# Patient Record
Sex: MALE | Race: WHITE
[De-identification: names, ages, dates, MRNs, and addresses within clinical notes are randomized per-mention and may not be internally consistent; named-entity substitution may affect disease eponyms.]

---

## 2019-11-26 ENCOUNTER — HOSPITAL ENCOUNTER (INPATIENT)
Dept: HOSPITAL 36 - GERO | Age: 69
LOS: 8 days | Discharge: ANOTHER HOSPITAL | DRG: 885 | End: 2019-12-04
Attending: PSYCHIATRY & NEUROLOGY | Admitting: PSYCHIATRY & NEUROLOGY
Payer: MEDICARE

## 2019-11-26 ENCOUNTER — HOSPITAL ENCOUNTER (EMERGENCY)
Dept: HOSPITAL 4 - SED | Age: 69
Discharge: PSYCHIATRIC OR SUBSTANCE USE FACILITY | End: 2019-11-26
Payer: COMMERCIAL

## 2019-11-26 VITALS — WEIGHT: 195 LBS | HEIGHT: 72 IN | BODY MASS INDEX: 26.41 KG/M2

## 2019-11-26 VITALS — DIASTOLIC BLOOD PRESSURE: 86 MMHG | SYSTOLIC BLOOD PRESSURE: 142 MMHG

## 2019-11-26 VITALS — SYSTOLIC BLOOD PRESSURE: 175 MMHG

## 2019-11-26 DIAGNOSIS — E86.0: ICD-10-CM

## 2019-11-26 DIAGNOSIS — F41.9: ICD-10-CM

## 2019-11-26 DIAGNOSIS — Z02.89: ICD-10-CM

## 2019-11-26 DIAGNOSIS — E11.9: ICD-10-CM

## 2019-11-26 DIAGNOSIS — I10: ICD-10-CM

## 2019-11-26 DIAGNOSIS — F03.90: ICD-10-CM

## 2019-11-26 DIAGNOSIS — E78.00: ICD-10-CM

## 2019-11-26 DIAGNOSIS — F23: ICD-10-CM

## 2019-11-26 DIAGNOSIS — G45.9: ICD-10-CM

## 2019-11-26 DIAGNOSIS — F29: ICD-10-CM

## 2019-11-26 DIAGNOSIS — F39: Primary | ICD-10-CM

## 2019-11-26 DIAGNOSIS — F25.9: ICD-10-CM

## 2019-11-26 DIAGNOSIS — E11.65: Primary | ICD-10-CM

## 2019-11-26 DIAGNOSIS — E11.22: ICD-10-CM

## 2019-11-26 DIAGNOSIS — R26.89: ICD-10-CM

## 2019-11-26 DIAGNOSIS — Z86.73: ICD-10-CM

## 2019-11-26 DIAGNOSIS — I12.9: ICD-10-CM

## 2019-11-26 DIAGNOSIS — N18.9: ICD-10-CM

## 2019-11-26 LAB
ALBUMIN SERPL BCP-MCNC: 3.5 G/DL (ref 3.4–4.8)
ALT SERPL W P-5'-P-CCNC: 31 U/L (ref 12–78)
AMPHETAMINES UR QL SCN: NEGATIVE
ANION GAP SERPL CALCULATED.3IONS-SCNC: 5 MMOL/L (ref 5–15)
APAP SERPL-MCNC: < 1 UG/ML (ref 1–30)
APPEARANCE UR: CLEAR
AST SERPL W P-5'-P-CCNC: 16 U/L (ref 10–37)
BACTERIA URNS QL MICRO: (no result) /HPF
BARBITURATES UR QL SCN: NEGATIVE
BASOPHILS # BLD AUTO: 0.1 K/UL (ref 0–0.2)
BASOPHILS NFR BLD AUTO: 0.7 % (ref 0–2)
BENZODIAZ UR QL SCN: NEGATIVE
BILIRUB SERPL-MCNC: 1.1 MG/DL (ref 0–1)
BILIRUB UR QL STRIP: NEGATIVE
BUN SERPL-MCNC: 23 MG/DL (ref 8–21)
BZE UR QL SCN: NEGATIVE
CALCIUM SERPL-MCNC: 9.3 MG/DL (ref 8.4–11)
CANNABINOIDS UR QL SCN: NEGATIVE
CHLORIDE SERPL-SCNC: 101 MMOL/L (ref 98–107)
CHOLEST SERPL-MCNC: 158 MG/DL (ref ?–200)
COLOR UR: YELLOW
CREAT SERPL-MCNC: 1.07 MG/DL (ref 0.55–1.3)
EOSINOPHIL # BLD AUTO: 0.1 K/UL (ref 0–0.4)
EOSINOPHIL NFR BLD AUTO: 1 % (ref 0–4)
ERYTHROCYTE [DISTWIDTH] IN BLOOD BY AUTOMATED COUNT: 13.7 % (ref 9–15)
ETHANOL SERPL-MCNC: < 3 MG/DL (ref ?–10)
GFR SERPL CREATININE-BSD FRML MDRD: 88 ML/MIN (ref 90–?)
GLUCOSE SERPL-MCNC: 317 MG/DL (ref 70–99)
GLUCOSE UR STRIP-MCNC: (no result) MG/DL
HCT VFR BLD AUTO: 42.9 % (ref 36–54)
HDLC SERPL-MCNC: 45 MG/DL (ref 45–?)
HGB BLD-MCNC: 14.6 G/DL (ref 14–18)
HGB UR QL STRIP: (no result)
KETONES UR STRIP-MCNC: NEGATIVE MG/DL
LDL CHOLESTEROL: 96 MG/DL (ref ?–100)
LEUKOCYTE ESTERASE UR QL STRIP: NEGATIVE
LYMPHOCYTES # BLD AUTO: 1.5 K/UL (ref 1–5.5)
LYMPHOCYTES NFR BLD AUTO: 17.6 % (ref 20.5–51.5)
MCH RBC QN AUTO: 32 PG (ref 27–31)
MCHC RBC AUTO-ENTMCNC: 34 % (ref 32–36)
MCV RBC AUTO: 93 FL (ref 79–98)
METHADONE UR-SCNC: NEGATIVE UMOL/L
METHAMPHET UR-SCNC: NEGATIVE UMOL/L
MONOCYTES # BLD MANUAL: 0.7 K/UL (ref 0–1)
MONOCYTES # BLD MANUAL: 8.5 % (ref 1.7–9.3)
NEUTROPHILS # BLD AUTO: 6.1 K/UL (ref 1.8–7.7)
NEUTROPHILS NFR BLD AUTO: 72.2 % (ref 40–70)
NITRITE UR QL STRIP: NEGATIVE
OPIATES UR QL SCN: NEGATIVE
OXYCODONE SERPL-MCNC: NEGATIVE NG/ML
PCP UR QL SCN: NEGATIVE
PH UR STRIP: 6 [PH] (ref 5–8)
PLATELET # BLD AUTO: 216 K/UL (ref 130–430)
POTASSIUM SERPL-SCNC: 4.7 MMOL/L (ref 3.5–5.1)
PROT UR QL STRIP: NEGATIVE
RBC # BLD AUTO: 4.63 MIL/UL (ref 4.2–6.2)
RBC #/AREA URNS HPF: (no result) /HPF (ref 0–3)
SODIUM SERPLBLD-SCNC: 136 MMOL/L (ref 136–145)
SP GR UR STRIP: <1.005 (ref 1–1.03)
TRICYCLICS UR-MCNC: NEGATIVE NG/ML
TRIGL SERPL-MCNC: 94 MG/DL (ref 30–150)
URINE PROPOXYPHENE SCREEN: NEGATIVE
UROBILINOGEN UR STRIP-MCNC: 0.2 MG/DL (ref 0.2–1)
WBC # BLD AUTO: 8.5 K/UL (ref 4.8–10.8)
WBC #/AREA URNS HPF: (no result) /HPF (ref 0–3)

## 2019-11-26 PROCEDURE — 83036 HEMOGLOBIN GLYCOSYLATED A1C: CPT

## 2019-11-26 PROCEDURE — 82962 GLUCOSE BLOOD TEST: CPT

## 2019-11-26 PROCEDURE — 99285 EMERGENCY DEPT VISIT HI MDM: CPT

## 2019-11-26 PROCEDURE — 80061 LIPID PANEL: CPT

## 2019-11-26 PROCEDURE — 80307 DRUG TEST PRSMV CHEM ANLYZR: CPT

## 2019-11-26 PROCEDURE — G0480 DRUG TEST DEF 1-7 CLASSES: HCPCS

## 2019-11-26 PROCEDURE — 85025 COMPLETE CBC W/AUTO DIFF WBC: CPT

## 2019-11-26 PROCEDURE — 36415 COLL VENOUS BLD VENIPUNCTURE: CPT

## 2019-11-26 PROCEDURE — 80053 COMPREHEN METABOLIC PANEL: CPT

## 2019-11-26 PROCEDURE — 96372 THER/PROPH/DIAG INJ SC/IM: CPT

## 2019-11-26 PROCEDURE — 81000 URINALYSIS NONAUTO W/SCOPE: CPT

## 2019-11-26 PROCEDURE — 87081 CULTURE SCREEN ONLY: CPT

## 2019-11-26 PROCEDURE — G0481 DRUG TEST DEF 8-14 CLASSES: HCPCS

## 2019-11-26 PROCEDURE — G0482 DRUG TEST DEF 15-21 CLASSES: HCPCS

## 2019-11-26 NOTE — NUR
Patient to be transferred to [facility].  Is being transferred due to higher 
level of care.  Receiving facility has accepting physician and available space. 
ER physician has signed transfer form.  Patient or responsible party has agreed 
to transfer and signed form.  Patient belongings inventoried and will be sent 
with patient.  Copy of nursing notes, lab reports, EKG, Physicians Orders and 
X-rays to be sent with patient.  Report called to Tory at receiving facility.  
ambulance service has been called for transfer.

## 2019-11-26 NOTE — NUR
Pt brought by self, A&Ox1, pt presents to ER for medical clearance prior 
transfering to [facility], pt arrived nayeli  wheelchair , respirations even 
and unlabored, cap refill <3,

## 2019-11-26 NOTE — NUR
-------------------------------------------------------------------------------

           *** Note undone in EDM - 11/26/19 at 1748 by [email or web address] ***            

-------------------------------------------------------------------------------

PREM Bosch at bedside examining patient.

## 2019-11-27 ENCOUNTER — HOSPITAL ENCOUNTER (OUTPATIENT)
Dept: HOSPITAL 4 - SLB | Age: 69
Discharge: HOME | End: 2019-11-27
Payer: COMMERCIAL

## 2019-11-27 DIAGNOSIS — E80.7: ICD-10-CM

## 2019-11-27 DIAGNOSIS — N28.9: ICD-10-CM

## 2019-11-27 DIAGNOSIS — Z79.4: ICD-10-CM

## 2019-11-27 DIAGNOSIS — E11.9: Primary | ICD-10-CM

## 2019-11-27 DIAGNOSIS — I10: ICD-10-CM

## 2019-11-27 DIAGNOSIS — Z86.73: ICD-10-CM

## 2019-11-27 DIAGNOSIS — R26.9: ICD-10-CM

## 2019-11-27 DIAGNOSIS — E78.00: ICD-10-CM

## 2019-11-27 DIAGNOSIS — F03.90: ICD-10-CM

## 2019-11-27 DIAGNOSIS — Z87.891: ICD-10-CM

## 2019-11-27 DIAGNOSIS — F20.9: ICD-10-CM

## 2019-11-27 LAB
CHOLEST SERPL-MCNC: 176 MG/DL (ref ?–200)
HDLC SERPL-MCNC: 51 MG/DL (ref 45–?)
LDL CHOLESTEROL: 116 MG/DL (ref ?–100)
TRIGL SERPL-MCNC: 84 MG/DL (ref 30–150)

## 2019-11-27 RX ADMIN — INSULIN GLARGINE SCH: 100 INJECTION, SOLUTION SUBCUTANEOUS at 21:43

## 2019-11-27 RX ADMIN — INSULIN GLARGINE SCH: 100 INJECTION, SOLUTION SUBCUTANEOUS at 10:46

## 2019-11-27 RX ADMIN — INSULIN LISPRO SCH: 100 INJECTION, SOLUTION INTRAVENOUS; SUBCUTANEOUS at 21:44

## 2019-11-27 NOTE — HISTORY & PHYSICAL
ADMIT DATE:  11/26/2019



CHIEF COMPLAINT:  Admitted for inpatient psych unit.



HISTORY OF PRESENT ILLNESS:  This is a 69-year-old  male with history

of diabetes, renal insufficiency, hypertension, high cholesterol, history of

stroke, dementia, admitted from [facility] to inpatient

psych.  The patient was cleared medically from the ER.  The patient is a poor

historian.  Denies chest pain, shortness of breath.



PAST MEDICAL HISTORY:  As mentioned in history of present illness.



PAST SURGICAL HISTORY:  Denies surgeries in the past.



ALLERGIES:  No known drug allergies.



MEDICATIONS:  The patient is on amlodipine, aspirin, atorvastatin, Dulcolax,

Colace, fleet enema, Lantus insulin sliding scale, ____, Namenda, multivitamin,

Seroquel.



FAMILY HISTORY:  Noncontributory.



SOCIAL HISTORY:  The patient is a tailor.  He used to smoke, drink.  No drug

use.  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



REVIEW OF SYSTEMS:

GENERAL:  Complaints not feeling well.

HEENT:  No blurred vision or pain.

LUNGS:  No COPD or asthma.

HEART:  The patient with hypertension, history of stroke.

ABDOMEN:  No nausea, vomiting or pain.

GENITOURINARY:  The patient denies increased frequency or dysuria.

NEUROLOGIC:  No headache, seizure, history of stroke.

PSYCHIATRIC:  See above.

ENDOCRINE:  The patient has diabetes.



PHYSICAL EXAMINATION:

VITAL SIGNS:  Blood pressure 126/78, respirations 20, pulse 79, temperature

98.0.

GENERAL:  Elderly male, disheveled.

NECK:  Supple.

LUNGS:  Clear breath sounds, few rhonchi.

HEART:  Regular rate and rhythm with systolic ejection murmur.

ABDOMEN:  Soft, globular.

EXTREMITIES:  Positive excoriation, no deformity.

NEUROLOGIC:  Limited.



LABORATORY DATA:  WBC 8, hemoglobin 14, platelets 216.  UA 3+ ketones and few

bacteria.  Glucose 317, BUN 23, creatinine 1.07.  Total bilirubin is 1.1.  AST

and ALT 36 and 31.



ASSESSMENT:

1.  Diabetes.

2.  Renal insufficiency, elevated bilirubin.

3.  Hypertension.

4.  History of stroke.

5.  Dementia.

6.  Schizoaffective disorder.

7.  Hypercholesterolemia.

8.  Gait instability.



PLAN:  ____ insulin sliding scale.  We will start the patient on blood pressure

medication.  Continue fall precaution.  Continue aspirin.  We will continue

monitoring closely with you, Dr. Arguello.





DD: 11/27/2019 13:01

DT: 11/27/2019 13:27

JOB# [id number]  [id number]

## 2019-11-27 NOTE — PSYCHIATRIC EVALUATION
DATE OF SERVICE:  11/27/2019



HISTORY OF PRESENT ILLNESS:  A 69-year-old male transferred from skilled

nursing.  Apparently was aggressive, agitated, trying to strike out, yelling

constantly, refusing medications, refusing pretty much everything.  The patient

is AO to name, not place, not situation, not year, not month.  He gets very mad

as I asked him questions, started yelling at me, screaming, having grunting at

me.



PAST PSYCHIATRIC HISTORY:  Unclear likely dementia.



FAMILY HISTORY:  Unclear, not answering.



SOCIAL HISTORY:  Does not tell me where he was born and states he was born in

1911.  This is incorrect.  He was born in 1950.  Unclear family support.



MEDICATIONS:  Reviewed.



MEDICAL HISTORY:  Noted.



MENTAL STATUS EXAMINATION:  Stated age, disheveled, unkempt, angry, upset,

yelling, screaming, confused.  Unclear psychotic symptoms.  No overt suicidal

ideations, poor impulse control.



PROVISIONAL DIAGNOSES:  Mood, unspecified; anxiety, unspecified; psychosis,

unspecified; rule out dementia, likely dementia.



MEDICAL:  Please see full H and P.



ESTIMATED LENGTH OF STAY:  7-10 days.



ASSESSMENT:  The patient requiring hospitalization.  Angry upset, agitated,

striking out.



TREATMENT PLAN:  Includes group as well as milieu therapy.



CONDITIONS FOR DISCHARGE:  Improved mood, improved affect.





DD: 11/27/2019 12:32

DT: 11/27/2019 17:48

JOB# [id number]  [id number]

## 2019-11-28 RX ADMIN — INSULIN GLARGINE SCH: 100 INJECTION, SOLUTION SUBCUTANEOUS at 21:43

## 2019-11-28 RX ADMIN — INSULIN GLARGINE SCH: 100 INJECTION, SOLUTION SUBCUTANEOUS at 08:09

## 2019-11-28 RX ADMIN — INSULIN LISPRO SCH: 100 INJECTION, SOLUTION INTRAVENOUS; SUBCUTANEOUS at 21:45

## 2019-11-28 NOTE — PROGRESS NOTES
DATE:  11/28/2019



SUBJECTIVE:  A 69-year-old male transferred from skilled nursing.  The patient

is aggressive, agitated, trying to strike, yelling, very irritable, yelling this

morning, agitated this morning, not wanting to talk to me.  Per ,

the patient is coming from [facility].  The patient remains

symptomatic, impulsive, irritable, refusing vitals for example, on and off

feeling episodes, unkempt.  We will continue to monitor ongoing symptoms. 

Continue dosing of Seroquel.  We will consider the addition of a mood

stabilizing medication.





DD: 11/28/2019 06:27

DT: 11/28/2019 06:35

JOB# [id number]  [id number]

## 2019-11-28 NOTE — INTERNAL MEDICINE PROG NOTE
Internal Medicine Subjective





- Subjective


Patient seen and examined:: with staff, chart reviewed


Patient is:: awake, verbal, interactive, in bed


Per staff patient has:: no adverse event, no episodes of fall, poor appetite, 

tolerating meds





Internal Medicine Objective





- Physical Exam


Vitals and I&O: 


 Vital Signs











Temp  96.3 F   11/27/19 15:43


 


Pulse  67   11/27/19 15:43


 


Resp  20   11/27/19 20:00


 


BP  143/94   11/27/19 15:43


 


Pulse Ox  98   11/27/19 15:43








 Intake & Output











 11/27/19 11/28/19 11/28/19





 18:59 06:59 18:59


 


Intake Total  240 


 


Balance  240 


 


Intake:   


 


  Oral  240 


 


Other:   


 


  # Voids  2 


 


  # Bowel Movements  0 











Active Medications: 


Current Medications





Acetaminophen (Tylenol)  650 mg PO Q4HR PRN


   PRN Reason: Mild Pain / Temp above 100


   Stop: 01/25/20 20:49


Al Hydrox/Mg Hydrox/Simethicone (Maalox)  30 ml PO Q4HR PRN


   PRN Reason: GI DISTRESS


   Stop: 01/25/20 22:43


Amlodipine Besylate (Norvasc)  5 mg PO BID [facility]


   Stop: 01/26/20 08:59


   Last Admin: 11/28/19 08:09 Dose:  Not Given


Aspirin (Aspirin)  325 mg PO DAILY@1200 [facility]


   Stop: 01/26/20 11:59


   Last Admin: 11/28/19 12:31 Dose:  Not Given


Atorvastatin Calcium (Lipitor)  40 mg PO HS [facility]


   Stop: 01/26/20 20:59


   Last Admin: 11/27/19 21:43 Dose:  Not Given


Bisacodyl (Dulcolax 10 Mg Supp)  10 mg RC PRN PRN


   PRN Reason: if MOM is ineffective


   Stop: 01/25/20 22:49


Divalproex Sodium (Depakote Dr)  250 mg PO BID [facility]; Protocol


   Stop: 01/27/20 08:59


   Last Admin: 11/28/19 08:09 Dose:  Not Given


Docusate Sodium (Colace)  100 mg PO DAILY [facility]


   Stop: 01/26/20 08:59


   Last Admin: 11/28/19 08:09 Dose:  Not Given


Insulin Glargine (Lantus Insulin)  10 units SUBQ Q12HR [facility]


   Stop: 01/26/20 08:59


   Last Admin: 11/28/19 08:09 Dose:  Not Given


Insulin Human Lispro (Humalog Insulin Sliding Scale)  100 units SUBQ HS [facility]; 

Protocol


   Stop: 01/26/20 20:59


   Last Admin: 11/27/19 21:44 Dose:  Not Given


Lisinopril (Zestril)  10 mg PO HS [facility]


   Stop: 01/26/20 20:59


   Last Admin: 11/27/19 21:44 Dose:  Not Given


Lorazepam (Ativan)  0.5 mg PO Q4HR PRN; Protocol


   PRN Reason: Anxiety


   Stop: 12/26/19 22:43


   Last Admin: 11/28/19 01:44 Dose:  0.5 mg


Magnesium Hydroxide (Milk Of Magnesia)  30 ml PO HS PRN


   PRN Reason: Constipation


Memantine (Namenda)  10 mg PO BID [facility]


   Stop: 01/26/20 08:59


   Last Admin: 11/28/19 08:09 Dose:  Not Given


Multivitamins/Vitamin C (Theragran)  1 tab PO DAILY [facility]


   Stop: 01/26/20 08:59


   Last Admin: 11/28/19 08:09 Dose:  Not Given


Quetiapine Fumarate (Seroquel)  400 mg PO BID [facility]; Protocol


   Stop: 01/26/20 08:59


   Last Admin: 11/28/19 08:09 Dose:  Not Given


Zolpidem Tartrate (Ambien)  5 mg PO HS PRN


   PRN Reason: Insomnia


   Stop: 01/25/20 22:43








General: weak


HEENT: NC/AT, PERRLA


Neck: Supple, No JVD


Lungs: CTAB


Cardiovascular: RRR, Normal S1, Normal S2, with murmur


Abdomen: soft, globular, non-distended, positive bowel sound


Extremities: excoriation


Neurological: no change





Internal Medicine Assmt/Plan





- Assessment


Assessment: 





ASSESSMENT:


1.  Diabetes.


2.  Renal insufficiency, elevated bilirubin.


3.  Hypertension.


4.  History of stroke.


5.  Dementia.


6.  Schizoaffective disorder.


7.  Hypercholesterolemia.


8.  Gait instability.











- Plan


Plan: 











PLAN:  __cont on __ insulin sliding scale.  We will start the patient on blood 

pressure


medication.  Continue fall precaution.  Continue aspirin.  We will continue


monitoring closely 


miguelangel lopez

## 2019-11-29 RX ADMIN — INSULIN GLARGINE SCH: 100 INJECTION, SOLUTION SUBCUTANEOUS at 21:13

## 2019-11-29 RX ADMIN — INSULIN LISPRO SCH: 100 INJECTION, SOLUTION INTRAVENOUS; SUBCUTANEOUS at 21:14

## 2019-11-29 RX ADMIN — INSULIN GLARGINE SCH: 100 INJECTION, SOLUTION SUBCUTANEOUS at 08:22

## 2019-11-29 NOTE — PROGRESS NOTES
DATE:  11/29/2019



SUBJECTIVE:  A 69-year-old male coming into the hospital, really upset,

irritable, refusing to speak with me.  Staff noting he is very morales and rude,

poorly oriented, confused, some agitation, verbally abusive towards staff. 

Medications were noted.  Vitals were reviewed.



PLAN:  We will continue to monitor.  Continue dosing of Seroquel.  I did add

Depakote to his regimen.





DD: 11/29/2019 06:16

DT: 11/29/2019 06:31

JOB# [id number]  [id number]

## 2019-11-30 RX ADMIN — INSULIN LISPRO SCH UNITS: 100 INJECTION, SOLUTION INTRAVENOUS; SUBCUTANEOUS at 20:38

## 2019-11-30 RX ADMIN — INSULIN GLARGINE SCH: 100 INJECTION, SOLUTION SUBCUTANEOUS at 08:06

## 2019-11-30 RX ADMIN — INSULIN GLARGINE SCH UNITS: 100 INJECTION, SOLUTION SUBCUTANEOUS at 20:36

## 2019-11-30 NOTE — PROGRESS NOTES
DATE:  11/30/2019



Covering for Dr. Anthony.



Case was discussed with staff of the patient, reviewed records.  The patient was

admitted on 11/27/2019 because of aggressive behavior.  He was striking out,

yelling constantly, refusing medication, refusing pretty much everything and

oriented to name only, not place or situation.  He is demented, confused, unable

to make safe plan for self-care.  Continues to have poor insight, easily

agitated.  No side effects with the medication, no sedation, no nausea.  Tend to

be very upset.  In general, he has been on Depakote 250 mg twice a day and

Namenda 10 mg twice a day, Seroquel 400 mg twice a day.  No side effects, no

sedation, no nausea, no extrapyramidal symptoms.  We will continue outpatient

group therapy, milieu therapy, and adjust medications as needed.





DD: 11/30/2019 12:21

DT: 11/30/2019 14:56

JOB# [id number]  [id number]

## 2019-11-30 NOTE — INTERNAL MEDICINE PROG NOTE
Internal Medicine Subjective





- Subjective


Patient seen and examined:: with staff, chart reviewed


Patient is:: awake, verbal, interactive, in bed


Per staff patient has:: no adverse event, no episodes of fall, poor appetite, 

tolerating meds





Internal Medicine Objective





- Physical Exam


Vitals and I&O: 


 Vital Signs











Temp  98.0 F   19 14:00


 


Pulse  74   19 14:00


 


Resp  20   19 14:00


 


BP  130/80   19 14:00


 


Pulse Ox  94   19 14:00








 Intake & Output











 19





 18:59 06:59 18:59


 


Intake Total  480 


 


Output Total  2 


 


Balance  478 


 


Intake:   


 


  Oral  480 


 


Output:   


 


  Urine/Stool Mix  2 


 


Other:   


 


  # Voids  1 











Active Medications: 


Current Medications





Acetaminophen (Tylenol)  650 mg PO Q4HR PRN


   PRN Reason: Mild Pain / Temp above 100


   Stop: 20 20:49


Al Hydrox/Mg Hydrox/Simethicone (Maalox)  30 ml PO Q4HR PRN


   PRN Reason: GI DISTRESS


   Stop: 20 22:43


Amlodipine Besylate (Norvasc)  5 mg PO BID [facility]


   Stop: 20 08:59


   Last Admin: 19 08:06 Dose:  Not Given


Aspirin (Aspirin)  325 mg PO DAILY@1200 [facility]


   Stop: 20 11:59


   Last Admin: 19 12:44 Dose:  Not Given


Atorvastatin Calcium (Lipitor)  40 mg PO HS [facility]


   Stop: 20 20:59


   Last Admin: 19 20:49 Dose:  40 mg


Bisacodyl (Dulcolax 10 Mg Supp)  10 mg RC PRN PRN


   PRN Reason: if MOM is ineffective


   Stop: 20 22:49


Divalproex Sodium (Depakote Dr)  250 mg PO BID [facility]; Protocol


   Stop: 20 08:59


   Last Admin: 19 08:06 Dose:  Not Given


Docusate Sodium (Colace)  100 mg PO DAILY [facility]


   Stop: 20 08:59


   Last Admin: 19 08:06 Dose:  Not Given


Insulin Glargine (Lantus Insulin)  10 units SUBQ Q12HR [facility]


   Stop: 20 08:59


   Last Admin: 19 08:06 Dose:  Not Given


Insulin Human Lispro (Humalog Insulin Sliding Scale)  100 units SUBQ HS [facility]; 

Protocol


   Stop: 20 20:59


   Last Admin: 19 21:14 Dose:  Not Given


Lisinopril (Zestril)  10 mg PO HS [facility]


   Stop: 20 20:59


   Last Admin: 19 20:49 Dose:  10 mg


Lorazepam (Ativan)  0.5 mg PO Q4HR PRN; Protocol


   PRN Reason: Anxiety


   Stop: 19 22:43


   Last Admin: 19 02:32 Dose:  0.5 mg


Magnesium Hydroxide (Milk Of Magnesia)  30 ml PO HS PRN


   PRN Reason: Constipation


Memantine (Namenda)  10 mg PO BID [facility]


   Stop: 20 08:59


   Last Admin: 19 08:06 Dose:  Not Given


Multivitamins/Vitamin C (Theragran)  1 tab PO DAILY [facility]


   Stop: 20 08:59


   Last Admin: 19 08:06 Dose:  Not Given


Quetiapine Fumarate (Seroquel)  400 mg PO BID [facility]; Protocol


   Stop: 20 08:59


   Last Admin: 19 08:06 Dose:  Not Given


Zolpidem Tartrate (Ambien)  5 mg PO HS PRN


   PRN Reason: Insomnia


   Stop: 20 22:43


   Last Admin: 19 20:49 Dose:  5 mg








General: weak


HEENT: NC/AT, PERRLA


Neck: Supple, No JVD


Lungs: CTAB


Cardiovascular: RRR, Normal S1, Normal S2, with murmur


Abdomen: soft, globular, non-distended, positive bowel sound


Extremities: excoriation


Neurological: no change





Internal Medicine Assmt/Plan





- Assessment


Assessment: 





ASSESSMENT:


1.  Diabetes.


2.  Renal insufficiency, elevated bilirubin.


3.  Hypertension.


4.  History of stroke.


5.  Dementia.


6.  Schizoaffective disorder.


7.  Hypercholesterolemia.


8.  Gait instability.











- Plan


Plan: 











PLAN:  __cont on __ insulin sliding scale.  We will start the patient on blood 

pressure


medication.  Continue fall precaution.  Continue aspirin.  We will continue


monitoring closely 


dw rn





Nutritional Asmnt/Malnutr-PDOC





- Dietary Evaluation


Malnutrition Findings (Please click <Entered> for more info): 








Nutritional Asmnt/Malnutrition                             Start:  19 09:

31


Text:                                                      Status: Complete    

  


Freq:                                                                          

  


Protocol:                                                                      

  


 Document     19 09:31  ABIOLA  (Rec: 19 09:49  MARLODONITA MADRID-

FNS1)


 Nutritional Asmnt/Malnutrition


     Patient General Information


      Nutritional Screening                      Moderate Risk


      Diagnosis                                  Psychosis


      Pertinent Medical Hx/Surgical Hx           Diabetes, Renal insufficiency,


                                                 hypertension, high


                                                 cholesterol, history of stroke


                                                 , dementia.


      Subjective Information                     Admitted from [facility].


                                                 Per nursing notes, BG was 344


                                                 but patient refused to take


                                                 his insulin. Patient


                                                 tolerating current diet


                                                 without noted difficulty.


      Current Diet Order/ Nutrition Support      60 gm CCHO, Chopped


      Patient / S.O                              Not Indicated


      Pertinent Medications                      Maalox, Lipitor, Dulcolax,


                                                 COlace, Lantus, humalog, MOM,


                                                 theragran


      Pertinent Labs                             POC glucose 344 per nursing


                                                 note


     Nutritional Hx/Data


      Height                                     1.73 m


      Height (Calculated Centimeters)            172.7


      Current Weight (lbs)                       74.843 kg


      Weight (Calculated Kilograms)              74.8


      Weight (Calculated Grams)                  10406.7


      Ideal Body Weight                          154


      % Ideal Body Weight                        107


      Body Mass Index (BMI)                      25.0


      Recent Weight Change                       No


      Weight Status                              Overweight


     GI Symptoms


      GI Symptoms                                None


      Last BM                                    11/29 x 1


      Difficult in:                              None


      Food Allergies                             No


      Cultural/Ethnic/Confucianism Belief           none indicated


      Usual diet at home                         unknown


      Skin Integrity/Comment:                    Sanchez 13, Intact


      Current %PO                                Good (%)


     Estimated Nutritional Goals


      BEE in Kcals:                              Using Current wt


      Calories/Kcals/Kg                          75kg CBW


      Kcals Calculated                           ~5903-3049 kcal/day


      Protein:                                   Using Current wt


      Protein g/k.8-1 gm/kg


      Protein Calculated                         ~60-75 gm/day


      Fluid: ml                                  ~1706-9802 ml/day (1 ml/kcal)


     Nutritional Problem


      1. Problem


       Problem                                   Altered nutrition related lab


                                                 values related to


       Etiology                                  uncontrolled hyperglycemia aeb


       Signs/Symptoms:                           Glucose 344


     Intervention/Recommendation


      Comments                                   1. Continue 60 gm CCHO,


                                                 Chopped diet as tolerated by


                                                 patient.


                                                 2. MD to adjust insulin


                                                 regimen as needed for optimal


                                                 glycemic control. Nursing to


                                                 continue to encourage patient


                                                 to take insulin as prescribed.


     Expected Outcomes/Goals


      Expected Outcomes/Goals                    Oral intake >75% of meals,


                                                 weight stable, nutrition


                                                 related labs WNL


                                                 F/U MR 12/3-

## 2019-12-01 RX ADMIN — INSULIN GLARGINE SCH UNITS: 100 INJECTION, SOLUTION SUBCUTANEOUS at 08:51

## 2019-12-01 RX ADMIN — INSULIN GLARGINE SCH UNITS: 100 INJECTION, SOLUTION SUBCUTANEOUS at 21:52

## 2019-12-01 RX ADMIN — INSULIN LISPRO SCH UNITS: 100 INJECTION, SOLUTION INTRAVENOUS; SUBCUTANEOUS at 21:52

## 2019-12-01 NOTE — INTERNAL MEDICINE PROG NOTE
Internal Medicine Subjective





- Subjective


Patient seen and examined:: with staff, chart reviewed


Patient is:: awake, verbal, interactive, in bed


Per staff patient has:: no adverse event, no episodes of fall, poor appetite, 

tolerating meds





Internal Medicine Objective





- Physical Exam


Vitals and I&O: 


 Vital Signs











Temp  97.3 F   19 14:04


 


Pulse  70   19 14:04


 


Resp  16   19 14:04


 


BP  131/78   19 14:04


 


Pulse Ox  99   19 14:04








 Intake & Output











 19





 18:59 06:59 18:59


 


Intake Total 1300 240 


 


Balance 1300 240 


 


Intake:   


 


  Oral 1300 240 


 


Other:   


 


  # Voids 4 2 


 


  # Bowel Movements 0  











Active Medications: 


Current Medications





Acetaminophen (Tylenol)  650 mg PO Q4HR PRN


   PRN Reason: Mild Pain / Temp above 100


   Stop: 20 20:49


Al Hydrox/Mg Hydrox/Simethicone (Maalox)  30 ml PO Q4HR PRN


   PRN Reason: GI DISTRESS


   Stop: 20 22:43


Amlodipine Besylate (Norvasc)  5 mg PO BID [facility]


   Stop: 20 08:59


   Last Admin: 19 09:36 Dose:  Not Given


Aspirin (Aspirin)  325 mg PO DAILY@1200 [facility]


   Stop: 20 11:59


   Last Admin: 19 12:44 Dose:  Not Given


Atorvastatin Calcium (Lipitor)  40 mg PO HS [facility]


   Stop: 20 20:59


   Last Admin: 19 20:36 Dose:  40 mg


Bisacodyl (Dulcolax 10 Mg Supp)  10 mg RC PRN PRN


   PRN Reason: if MOM is ineffective


   Stop: 20 22:49


Divalproex Sodium (Depakote Dr)  250 mg PO BID [facility]; Protocol


   Stop: 20 08:59


   Last Admin: 19 08:38 Dose:  250 mg


Docusate Sodium (Colace)  100 mg PO DAILY [facility]


   Stop: 20 08:59


   Last Admin: 19 08:38 Dose:  100 mg


Insulin Glargine (Lantus Insulin)  10 units SUBQ Q12HR [facility]


   Stop: 20 08:59


   Last Admin: 19 08:51 Dose:  10 units


Insulin Human Lispro (Humalog Insulin Sliding Scale)  100 units SUBQ HS [facility]; 

Protocol


   Stop: 20 20:59


   Last Admin: 19 20:38 Dose:  4 units


Lisinopril (Zestril)  10 mg PO HS [facility]


   Stop: 20 20:59


   Last Admin: 19 20:41 Dose:  10 mg


Lorazepam (Ativan)  0.5 mg PO Q4HR PRN; Protocol


   PRN Reason: Anxiety


   Stop: 19 22:43


   Last Admin: 19 02:32 Dose:  0.5 mg


Magnesium Hydroxide (Milk Of Magnesia)  30 ml PO HS PRN


   PRN Reason: Constipation


Memantine (Namenda)  10 mg PO BID [facility]


   Stop: 20 08:59


   Last Admin: 19 08:38 Dose:  10 mg


Multivitamins/Vitamin C (Theragran)  1 tab PO DAILY [facility]


   Stop: 20 08:59


   Last Admin: 19 08:38 Dose:  1 tab


Quetiapine Fumarate (Seroquel)  400 mg PO BID [facility]; Protocol


   Stop: 20 08:59


   Last Admin: 19 08:38 Dose:  400 mg


Zolpidem Tartrate (Ambien)  5 mg PO HS PRN


   PRN Reason: Insomnia


   Stop: 20 22:43


   Last Admin: 19 20:43 Dose:  5 mg








General: weak


HEENT: NC/AT, PERRLA


Neck: Supple, No JVD


Lungs: CTAB


Cardiovascular: RRR, Normal S1, Normal S2, with murmur


Abdomen: soft, globular, non-distended, positive bowel sound


Extremities: excoriation


Neurological: no change





Internal Medicine Assmt/Plan





- Assessment


Assessment: 





ASSESSMENT:


1.  Diabetes.


2.  Renal insufficiency, elevated bilirubin.


3.  Hypertension.


4.  History of stroke.


5.  Dementia.


6.  Schizoaffective disorder.


7.  Hypercholesterolemia.


8.  Gait instability.











- Plan


Plan: 











PLAN:  __cont on __ insulin sliding scale.  We will start the patient on blood 

pressure


medication.  Continue fall precaution.  Continue aspirin.  We will continue


monitoring closely 


dw rn





Nutritional Asmnt/Malnutr-PDOC





- Dietary Evaluation


Malnutrition Findings (Please click <Entered> for more info): 








Nutritional Asmnt/Malnutrition                             Start:  19 09:

31


Text:                                                      Status: Complete    

  


Freq:                                                                          

  


Protocol:                                                                      

  


 Document     19 09:31  ABIOLA  (Rec: 19 09:49  MARLODONITA MADRID-

FNS1)


 Nutritional Asmnt/Malnutrition


     Patient General Information


      Nutritional Screening                      Moderate Risk


      Diagnosis                                  Psychosis


      Pertinent Medical Hx/Surgical Hx           Diabetes, Renal insufficiency,


                                                 hypertension, high


                                                 cholesterol, history of stroke


                                                 , dementia.


      Subjective Information                     Admitted from [facility].


                                                 Per nursing notes, BG was 344


                                                 but patient refused to take


                                                 his insulin. Patient


                                                 tolerating current diet


                                                 without noted difficulty.


      Current Diet Order/ Nutrition Support      60 gm CCHO, Chopped


      Patient / S.O                              Not Indicated


      Pertinent Medications                      Maalox, Lipitor, Dulcolax,


                                                 COlace, Lantus, humalog, MOM,


                                                 theragran


      Pertinent Labs                             POC glucose 344 per nursing


                                                 note


     Nutritional Hx/Data


      Height                                     1.73 m


      Height (Calculated Centimeters)            172.7


      Current Weight (lbs)                       74.843 kg


      Weight (Calculated Kilograms)              74.8


      Weight (Calculated Grams)                  92400.7


      Ideal Body Weight                          154


      % Ideal Body Weight                        107


      Body Mass Index (BMI)                      25.0


      Recent Weight Change                       No


      Weight Status                              Overweight


     GI Symptoms


      GI Symptoms                                None


      Last BM                                    11/29 x 1


      Difficult in:                              None


      Food Allergies                             No


      Cultural/Ethnic/[employer]ist Belief           none indicated


      Usual diet at home                         unknown


      Skin Integrity/Comment:                    Sanchez 13, Intact


      Current %PO                                Good (%)


     Estimated Nutritional Goals


      BEE in Kcals:                              Using Current wt


      Calories/Kcals/Kg                          75kg CBW


      Kcals Calculated                           ~1376-1933 kcal/day


      Protein:                                   Using Current wt


      Protein g/k.8-1 gm/kg


      Protein Calculated                         ~60-75 gm/day


      Fluid: ml                                  ~1771-2965 ml/day (1 ml/kcal)


     Nutritional Problem


      1. Problem


       Problem                                   Altered nutrition related lab


                                                 values related to


       Etiology                                  uncontrolled hyperglycemia aeb


       Signs/Symptoms:                           Glucose 344


     Intervention/Recommendation


      Comments                                   1. Continue 60 gm CCHO,


                                                 Chopped diet as tolerated by


                                                 patient.


                                                 2. MD to adjust insulin


                                                 regimen as needed for optimal


                                                 glycemic control. Nursing to


                                                 continue to encourage patient


                                                 to take insulin as prescribed.


     Expected Outcomes/Goals


      Expected Outcomes/Goals                    Oral intake >75% of meals,


                                                 weight stable, nutrition


                                                 related labs WNL


                                                 F/U  12/3-

## 2019-12-01 NOTE — PROGRESS NOTES
DATE:  12/01/2019



Case was discussed with staff of the patient, reviewed the records.  The patient

continues to be unpredictable, impulsive with episodes of yelling and refusing

at times medication, hard to redirect, disoriented, confused, and continues to

have poor insight.  No side effects of the medication, no sedation, no nausea,

no extrapyramidal symptoms.  However, he is not taking medications, sometimes,

it is hard to adjust medication that he is not taking and we will continue to

work with the patient in group therapy, milieu therapy, and adjust medications

as needed.





DD: 12/01/2019 12:24

DT: 12/01/2019 15:39

JOB# [id number]  [id number]

## 2019-12-02 RX ADMIN — INSULIN LISPRO SCH UNITS: 100 INJECTION, SOLUTION INTRAVENOUS; SUBCUTANEOUS at 21:02

## 2019-12-02 RX ADMIN — INSULIN GLARGINE SCH UNITS: 100 INJECTION, SOLUTION SUBCUTANEOUS at 10:16

## 2019-12-02 RX ADMIN — INSULIN GLARGINE SCH UNITS: 100 INJECTION, SOLUTION SUBCUTANEOUS at 21:00

## 2019-12-02 NOTE — INTERNAL MEDICINE PROG NOTE
Internal Medicine Subjective





- Subjective


Patient seen and examined:: with staff, chart reviewed


Patient is:: awake, verbal, interactive, in bed


Per staff patient has:: no adverse event, no episodes of fall, poor appetite, 

tolerating meds





Internal Medicine Objective





- Physical Exam


Vitals and I&O: 


 Vital Signs











Temp  97.2 F   19 05:20


 


Pulse  79   19 08:16


 


Resp  20   19 05:20


 


BP  119/79   19 08:16


 


Pulse Ox  92   19 05:20








 Intake & Output











 19





 18:59 06:59 18:59


 


Intake Total  120 


 


Balance  120 


 


Intake:   


 


  Oral  120 


 


Other:   


 


  # Voids 3 3 


 


  # Bowel Movements 0 0 











Active Medications: 


Current Medications





Acetaminophen (Tylenol)  650 mg PO Q4HR PRN


   PRN Reason: Mild Pain / Temp above 100


   Stop: 20 20:49


Al Hydrox/Mg Hydrox/Simethicone (Maalox)  30 ml PO Q4HR PRN


   PRN Reason: GI DISTRESS


   Stop: 20 22:43


Amlodipine Besylate (Norvasc)  5 mg PO BID [facility]


   Stop: 20 08:59


   Last Admin: 19 08:16 Dose:  5 mg


Aspirin (Aspirin)  325 mg PO DAILY@1200 [facility]


   Stop: 20 11:59


   Last Admin: 19 13:00 Dose:  325 mg


Atorvastatin Calcium (Lipitor)  40 mg PO HS [facility]


   Stop: 20 20:59


   Last Admin: 19 21:32 Dose:  40 mg


Bisacodyl (Dulcolax 10 Mg Supp)  10 mg RC PRN PRN


   PRN Reason: if MOM is ineffective


   Stop: 20 22:49


Divalproex Sodium (Depakote Dr)  250 mg PO BID [facility]; Protocol


   Stop: 20 08:59


   Last Admin: 19 08:16 Dose:  250 mg


Docusate Sodium (Colace)  100 mg PO DAILY [facility]


   Stop: 20 08:59


   Last Admin: 19 08:16 Dose:  100 mg


Insulin Glargine (Lantus Insulin)  10 units SUBQ Q12HR [facility]


   Stop: 20 08:59


   Last Admin: 19 10:16 Dose:  10 units


Insulin Human Lispro (Humalog Insulin Sliding Scale)  100 units SUBQ HS [facility]; 

Protocol


   Stop: 20 20:59


Lisinopril (Zestril)  10 mg PO HS [facility]


   Stop: 20 20:59


   Last Admin: 19 21:33 Dose:  10 mg


Lorazepam (Ativan)  0.5 mg PO Q4HR PRN; Protocol


   PRN Reason: Anxiety


   Stop: 19 22:43


   Last Admin: 19 02:32 Dose:  0.5 mg


Magnesium Hydroxide (Milk Of Magnesia)  30 ml PO HS PRN


   PRN Reason: Constipation


Memantine (Namenda)  10 mg PO BID [facility]


   Stop: 20 08:59


   Last Admin: 19 08:16 Dose:  10 mg


Multivitamins/Vitamin C (Theragran)  1 tab PO DAILY [facility]


   Stop: 20 08:59


   Last Admin: 19 08:16 Dose:  1 tab


Quetiapine Fumarate (Seroquel)  400 mg PO BID [facility]; Protocol


   Stop: 20 08:59


   Last Admin: 19 08:16 Dose:  400 mg


Zolpidem Tartrate (Ambien)  5 mg PO HS PRN


   PRN Reason: Insomnia


   Stop: 20 22:43


   Last Admin: 19 20:43 Dose:  5 mg








General: weak


HEENT: NC/AT, PERRLA


Neck: Supple, No JVD


Lungs: CTAB


Cardiovascular: RRR, Normal S1, Normal S2, with murmur


Abdomen: soft, globular, non-distended, positive bowel sound


Extremities: excoriation


Neurological: no change





Internal Medicine Assmt/Plan





- Assessment


Assessment: 





ASSESSMENT:


1.  Diabetes.


2.  Renal insufficiency, elevated bilirubin.


3.  Hypertension.


4.  History of stroke.


5.  Dementia.


6.  Schizoaffective disorder.


7.  Hypercholesterolemia.


8.  Gait instability.











- Plan


Plan: 











PLAN:  __cont on __ insulin sliding scale.  We will start the patient on blood 

pressure


medication.  Continue fall precaution.  Continue aspirin.  We will continue


monitoring closely 


dw rn





Nutritional Asmnt/Malnutr-PDOC





- Dietary Evaluation


Malnutrition Findings (Please click <Entered> for more info): 








Nutritional Asmnt/Malnutrition                             Start:  19 09:

31


Text:                                                      Status: Complete    

  


Freq:                                                                          

  


Protocol:                                                                      

  


 Document     19 09:31  MARLODONITA  (Rec: 19 09:49  MARLODONITA MADRID-

FNS1)


 Nutritional Asmnt/Malnutrition


     Patient General Information


      Nutritional Screening                      Moderate Risk


      Diagnosis                                  Psychosis


      Pertinent Medical Hx/Surgical Hx           Diabetes, Renal insufficiency,


                                                 hypertension, high


                                                 cholesterol, history of stroke


                                                 , dementia.


      Subjective Information                     Admitted from [facility].


                                                 Per nursing notes, BG was 344


                                                 but patient refused to take


                                                 his insulin. Patient


                                                 tolerating current diet


                                                 without noted difficulty.


      Current Diet Order/ Nutrition Support      60 gm CCHO, Chopped


      Patient / S.O                              Not Indicated


      Pertinent Medications                      Maalox, Lipitor, Dulcolax,


                                                 COlace, Lantus, humalog, MOM,


                                                 theragran


      Pertinent Labs                             POC glucose 344 per nursing


                                                 note


     Nutritional Hx/Data


      Height                                     1.73 m


      Height (Calculated Centimeters)            172.7


      Current Weight (lbs)                       74.843 kg


      Weight (Calculated Kilograms)              74.8


      Weight (Calculated Grams)                  86779.7


      Ideal Body Weight                          154


      % Ideal Body Weight                        107


      Body Mass Index (BMI)                      25.0


      Recent Weight Change                       No


      Weight Status                              Overweight


     GI Symptoms


      GI Symptoms                                None


      Last BM                                    11/29 x 1


      Difficult in:                              None


      Food Allergies                             No


      Cultural/Ethnic/[place] Belief           none indicated


      Usual diet at home                         unknown


      Skin Integrity/Comment:                    Sanchez 13, Intact


      Current %PO                                Good (%)


     Estimated Nutritional Goals


      BEE in Kcals:                              Using Current wt


      Calories/Kcals/Kg                          75kg CBW


      Kcals Calculated                           ~9918-0864 kcal/day


      Protein:                                   Using Current wt


      Protein g/k.8-1 gm/kg


      Protein Calculated                         ~60-75 gm/day


      Fluid: ml                                  ~0106-0423 ml/day (1 ml/kcal)


     Nutritional Problem


      1. Problem


       Problem                                   Altered nutrition related lab


                                                 values related to


       Etiology                                  uncontrolled hyperglycemia aeb


       Signs/Symptoms:                           Glucose 344


     Intervention/Recommendation


      Comments                                   1. Continue 60 gm CCHO,


                                                 Chopped diet as tolerated by


                                                 patient.


                                                 2. MD to adjust insulin


                                                 regimen as needed for optimal


                                                 glycemic control. Nursing to


                                                 continue to encourage patient


                                                 to take insulin as prescribed.


     Expected Outcomes/Goals


      Expected Outcomes/Goals                    Oral intake >75% of meals,


                                                 weight stable, nutrition


                                                 related labs WNL


                                                 F/U MR /3-5

## 2019-12-03 RX ADMIN — INSULIN GLARGINE SCH UNITS: 100 INJECTION, SOLUTION SUBCUTANEOUS at 11:13

## 2019-12-03 RX ADMIN — INSULIN GLARGINE SCH UNITS: 100 INJECTION, SOLUTION SUBCUTANEOUS at 21:38

## 2019-12-03 RX ADMIN — INSULIN LISPRO SCH: 100 INJECTION, SOLUTION INTRAVENOUS; SUBCUTANEOUS at 21:39

## 2019-12-03 NOTE — PROGRESS NOTES
DATE:  12/02/2019



SUBJECTIVE:  The patient in the hospital remains somewhat unpredictable, angry,

upset, not really talk to me, mostly disengaged, yelling at times, ongoing

behavioral disturbances, mostly resistant to care, requiring a higher level of

prompting, redirection.



ASSESSMENT:  The patient is not yelling or combative, but disorganized, mumbling

to self.



MEDICATIONS:  Reviewed.



Ongoing concerns that he may act out, strike out at others.





DD: 12/02/2019 15:52

DT: 12/02/2019 20:40

JOB# [id number]  [id number]

## 2019-12-03 NOTE — INTERNAL MEDICINE PROG NOTE
Internal Medicine Subjective





- Subjective


Patient seen and examined:: with staff, chart reviewed


Patient is:: awake, verbal, interactive, in bed


Per staff patient has:: no adverse event, no episodes of fall, poor appetite, 

tolerating meds





Internal Medicine Objective





- Physical Exam


Vitals and I&O: 


 Vital Signs











Temp  98.2 F   19 05:32


 


Pulse  85   19 10:24


 


Resp  20   19 08:00


 


BP  108/76   19 10:24


 


Pulse Ox  95   19 05:32








 Intake & Output











 19





 18:59 06:59 18:59


 


Intake Total 900 480 


 


Output Total  2 


 


Balance 900 478 


 


Intake:   


 


  Oral 900 480 


 


Output:   


 


  Urine/Stool Mix  2 


 


Other:   


 


  # Voids 3 1 


 


  # Bowel Movements 1  











Active Medications: 


Current Medications





Acetaminophen (Tylenol)  650 mg PO Q4HR PRN


   PRN Reason: Mild Pain (Scale 1-3)


   Stop: 20 20:49


Acetaminophen (Tylenol)  650 mg PO Q4H PRN


   PRN Reason: TEMP ABOVE 100


   Stop: 20 12:02


Al Hydrox/Mg Hydrox/Simethicone (Maalox)  30 ml PO Q4HR PRN


   PRN Reason: GI DISTRESS


   Stop: 20 22:43


Amlodipine Besylate (Norvasc)  5 mg PO BID [facility]


   Stop: 20 08:59


   Last Admin: 19 10:24 Dose:  Not Given


Aspirin (Aspirin)  325 mg PO DAILY@1200 [facility]


   Stop: 20 11:59


   Last Admin: 19 13:00 Dose:  325 mg


Atorvastatin Calcium (Lipitor)  40 mg PO HS [facility]


   Stop: 20 20:59


   Last Admin: 19 21:08 Dose:  Not Given


Bisacodyl (Dulcolax 10 Mg Supp)  10 mg RC PRN PRN


   PRN Reason: if MOM is ineffective


   Stop: 20 22:49


Divalproex Sodium (Depakote Dr)  250 mg PO BID [facility]; Protocol


   Stop: 20 08:59


   Last Admin: 19 10:23 Dose:  250 mg


Docusate Sodium (Colace)  100 mg PO DAILY [facility]


   Stop: 20 08:59


   Last Admin: 19 10:25 Dose:  100 mg


Insulin Glargine (Lantus Insulin)  10 units SUBQ Q12HR [facility]


   Stop: 20 08:59


   Last Admin: 19 11:13 Dose:  10 units


Insulin Human Lispro (Humalog Insulin Sliding Scale)  100 units SUBQ HS [facility]; 

Protocol


   Stop: 20 20:59


   Last Admin: 19 21:02 Dose:  4 units


Lisinopril (Zestril)  10 mg PO HS [facility]


   Stop: 20 20:59


   Last Admin: 19 21:09 Dose:  Not Given


Lorazepam (Ativan)  0.5 mg PO Q4HR PRN; Protocol


   PRN Reason: Anxiety


   Stop: 19 22:43


   Last Admin: 19 02:32 Dose:  0.5 mg


Magnesium Hydroxide (Milk Of Magnesia)  30 ml PO HS PRN


   PRN Reason: Constipation


Memantine (Namenda)  10 mg PO BID [facility]


   Stop: 20 08:59


   Last Admin: 19 10:23 Dose:  10 mg


Multivitamins/Vitamin C (Theragran)  1 tab PO DAILY [facility]


   Stop: 20 08:59


   Last Admin: 19 10:23 Dose:  1 tab


Quetiapine Fumarate (Seroquel)  400 mg PO BID [facility]; Protocol


   Stop: 20 08:59


   Last Admin: 19 10:23 Dose:  400 mg


Zolpidem Tartrate (Ambien)  5 mg PO HS PRN


   PRN Reason: Insomnia


   Stop: 20 22:43


   Last Admin: 19 20:43 Dose:  5 mg








General: weak


HEENT: NC/AT, PERRLA


Neck: Supple, No JVD


Lungs: CTAB


Cardiovascular: RRR, Normal S1, Normal S2, with murmur


Abdomen: soft, globular, non-distended, positive bowel sound


Extremities: excoriation


Neurological: no change





Internal Medicine Assmt/Plan





- Assessment


Assessment: 





ASSESSMENT:


1.  Diabetes.


2.  Renal insufficiency, elevated bilirubin.


3.  Hypertension.


4.  History of stroke.


5.  Dementia.


6.  Schizoaffective disorder.


7.  Hypercholesterolemia.


8.  Gait instability.











- Plan


Plan: 











PLAN:  __cont on __ insulin sliding scale.  We will start the patient on blood 

pressure


medication.  Continue fall precaution.  Continue aspirin.  We will continue


monitoring closely 


dw rn


will change diet consistency





Nutritional Asmnt/Malnutr-PDOC





- Dietary Evaluation


Malnutrition Findings (Please click <Entered> for more info): 








Nutritional Asmnt/Malnutrition                             Start:  19 09:

31


Text:                                                      Status: Complete    

  


Freq:                                                                          

  


Protocol:                                                                      

  


 Document     19 09:31  ABIOLA  (Rec: 19 09:49  MMRAMBO MADRID-

FNS1)


 Nutritional Asmnt/Malnutrition


     Patient General Information


      Nutritional Screening                      Moderate Risk


      Diagnosis                                  Psychosis


      Pertinent Medical Hx/Surgical Hx           Diabetes, Renal insufficiency,


                                                 hypertension, high


                                                 cholesterol, history of stroke


                                                 , dementia.


      Subjective Information                     Admitted from [facility].


                                                 Per nursing notes, BG was 344


                                                 but patient refused to take


                                                 his insulin. Patient


                                                 tolerating current diet


                                                 without noted difficulty.


      Current Diet Order/ Nutrition Support      60 gm CCHO, Chopped


      Patient / S.O                              Not Indicated


      Pertinent Medications                      Maalox, Lipitor, Dulcolax,


                                                 COlace, Lantus, humalog, MOM,


                                                 theragran


      Pertinent Labs                             POC glucose 344 per nursing


                                                 note


     Nutritional Hx/Data


      Height                                     1.73 m


      Height (Calculated Centimeters)            172.7


      Current Weight (lbs)                       74.843 kg


      Weight (Calculated Kilograms)              74.8


      Weight (Calculated Grams)                  15526.7


      Ideal Body Weight                          154


      % Ideal Body Weight                        107


      Body Mass Index (BMI)                      25.0


      Recent Weight Change                       No


      Weight Status                              Overweight


     GI Symptoms


      GI Symptoms                                None


      Last BM                                    11/29 x 1


      Difficult in:                              None


      Food Allergies                             No


      Cultural/Ethnic/Jainism Belief           none indicated


      Usual diet at home                         unknown


      Skin Integrity/Comment:                    Sanchez 13, Intact


      Current %PO                                Good (%)


     Estimated Nutritional Goals


      BEE in Kcals:                              Using Current wt


      Calories/Kcals/Kg                          75kg CBW


      Kcals Calculated                           ~7456-4147 kcal/day


      Protein:                                   Using Current wt


      Protein g/k.8-1 gm/kg


      Protein Calculated                         ~60-75 gm/day


      Fluid: ml                                  ~4179-3594 ml/day (1 ml/kcal)


     Nutritional Problem


      1. Problem


       Problem                                   Altered nutrition related lab


                                                 values related to


       Etiology                                  uncontrolled hyperglycemia aeb


       Signs/Symptoms:                           Glucose 344


     Intervention/Recommendation


      Comments                                   1. Continue 60 gm CCHO,


                                                 Chopped diet as tolerated by


                                                 patient.


                                                 2. MD to adjust insulin


                                                 regimen as needed for optimal


                                                 glycemic control. Nursing to


                                                 continue to encourage patient


                                                 to take insulin as prescribed.


     Expected Outcomes/Goals


      Expected Outcomes/Goals                    Oral intake >75% of meals,


                                                 weight stable, nutrition


                                                 related labs WNL


                                                 F/U MR /3-5

## 2019-12-03 NOTE — PROGRESS NOTES
DATE:  12/03/2019



SUBJECTIVE:  A 69-year-old male who remains confused, disoriented, disgruntled,

upset; yelling, cursing, not answering any questions; very angry; labile mood,

mostly withdrawn, disheveled, keeps to himself.



MEDICATIONS:  Noted.



ASSESSMENT:  Not really following redirection.



PLAN:  We will continue to monitor, adjust and titrate medications.  Ongoing

behavioral disturbances, angry; concerns for poor impulse control, striking out

behaviors.





DD: 12/03/2019 21:57

DT: 12/03/2019 22:42

[facility]# [id number]  [id number]

## 2019-12-04 ENCOUNTER — HOSPITAL ENCOUNTER (INPATIENT)
Dept: HOSPITAL 4 - SED | Age: 69
LOS: 9 days | Discharge: NURSING, REHAB OR LONG-TERM CARE | DRG: 641 | End: 2019-12-13
Payer: COMMERCIAL

## 2019-12-04 VITALS — BODY MASS INDEX: 22.08 KG/M2 | HEIGHT: 72 IN | WEIGHT: 163 LBS

## 2019-12-04 VITALS — SYSTOLIC BLOOD PRESSURE: 123 MMHG

## 2019-12-04 DIAGNOSIS — Z74.01: ICD-10-CM

## 2019-12-04 DIAGNOSIS — K29.70: ICD-10-CM

## 2019-12-04 DIAGNOSIS — R13.10: ICD-10-CM

## 2019-12-04 DIAGNOSIS — E86.0: Primary | ICD-10-CM

## 2019-12-04 DIAGNOSIS — K44.9: ICD-10-CM

## 2019-12-04 DIAGNOSIS — I12.9: ICD-10-CM

## 2019-12-04 DIAGNOSIS — E78.00: ICD-10-CM

## 2019-12-04 DIAGNOSIS — F03.90: ICD-10-CM

## 2019-12-04 DIAGNOSIS — Z86.73: ICD-10-CM

## 2019-12-04 DIAGNOSIS — J44.9: ICD-10-CM

## 2019-12-04 DIAGNOSIS — R62.7: ICD-10-CM

## 2019-12-04 DIAGNOSIS — E11.22: ICD-10-CM

## 2019-12-04 DIAGNOSIS — E44.0: ICD-10-CM

## 2019-12-04 DIAGNOSIS — F41.9: ICD-10-CM

## 2019-12-04 DIAGNOSIS — N18.9: ICD-10-CM

## 2019-12-04 DIAGNOSIS — R26.89: ICD-10-CM

## 2019-12-04 DIAGNOSIS — E78.5: ICD-10-CM

## 2019-12-04 DIAGNOSIS — F25.9: ICD-10-CM

## 2019-12-04 LAB
ALBUMIN SERPL BCP-MCNC: 3.2 G/DL (ref 3.4–4.8)
ALT SERPL W P-5'-P-CCNC: 29 U/L (ref 12–78)
ANION GAP SERPL CALCULATED.3IONS-SCNC: 5 MMOL/L (ref 5–15)
AST SERPL W P-5'-P-CCNC: 31 U/L (ref 10–37)
BASOPHILS # BLD AUTO: 0 K/UL (ref 0–0.2)
BASOPHILS NFR BLD AUTO: 0.3 % (ref 0–2)
BILIRUB SERPL-MCNC: 2 MG/DL (ref 0–1)
BUN SERPL-MCNC: 49 MG/DL (ref 8–21)
CALCIUM SERPL-MCNC: 9.4 MG/DL (ref 8.4–11)
CHLORIDE SERPL-SCNC: 106 MMOL/L (ref 98–107)
CREAT SERPL-MCNC: 1.06 MG/DL (ref 0.55–1.3)
EOSINOPHIL # BLD AUTO: 0 K/UL (ref 0–0.4)
EOSINOPHIL NFR BLD AUTO: 0.2 % (ref 0–4)
ERYTHROCYTE [DISTWIDTH] IN BLOOD BY AUTOMATED COUNT: 13.8 % (ref 9–15)
GFR SERPL CREATININE-BSD FRML MDRD: 89 ML/MIN (ref 90–?)
GLUCOSE SERPL-MCNC: 149 MG/DL (ref 70–99)
HCT VFR BLD AUTO: 43.9 % (ref 36–54)
HGB BLD-MCNC: 14.6 G/DL (ref 14–18)
LYMPHOCYTES # BLD AUTO: 0.8 K/UL (ref 1–5.5)
LYMPHOCYTES NFR BLD AUTO: 6.9 % (ref 20.5–51.5)
MCH RBC QN AUTO: 31 PG (ref 27–31)
MCHC RBC AUTO-ENTMCNC: 33 % (ref 32–36)
MCV RBC AUTO: 93 FL (ref 79–98)
MONOCYTES # BLD MANUAL: 1.3 K/UL (ref 0–1)
MONOCYTES # BLD MANUAL: 10.9 % (ref 1.7–9.3)
NEUTROPHILS # BLD AUTO: 9.7 K/UL (ref 1.8–7.7)
NEUTROPHILS NFR BLD AUTO: 81.7 % (ref 40–70)
PLATELET # BLD AUTO: 197 K/UL (ref 130–430)
POTASSIUM SERPL-SCNC: 4.5 MMOL/L (ref 3.5–5.1)
RBC # BLD AUTO: 4.7 MIL/UL (ref 4.2–6.2)
SODIUM SERPLBLD-SCNC: 142 MMOL/L (ref 136–145)
WBC # BLD AUTO: 11.9 K/UL (ref 4.8–10.8)

## 2019-12-04 PROCEDURE — G0378 HOSPITAL OBSERVATION PER HR: HCPCS

## 2019-12-04 RX ADMIN — INSULIN GLARGINE SCH: 100 INJECTION, SOLUTION SUBCUTANEOUS at 09:00

## 2019-12-04 NOTE — PROGRESS NOTES
DATE:  12/04/2019



SUBJECTIVE:  The patient is in bed, refusing to speak with me.  Dietician is

also there, refusing to speak with dietitian.  Staff noting, he is very

forgetful, disoriented, resistant to care, easily agitated, irritable,

impulsive, trying to hit staff at times, disheveled, unkempt, not allowing care,

very poor blood sugar, currently on dosing of Seroquel.



ASSESSMENT:  The patient remains unruly, very high blood sugar.  I will attempt

to lower the dosing of Seroquel to see if this will improve his blood sugar,

consider dose adjustments.





DD: 12/04/2019 12:45

DT: 12/04/2019 20:31

JOB# [id number]  [id number]

## 2019-12-05 VITALS — SYSTOLIC BLOOD PRESSURE: 133 MMHG

## 2019-12-05 LAB
APPEARANCE UR: CLEAR
BILIRUB UR QL STRIP: NEGATIVE
COLOR UR: YELLOW
GLUCOSE UR STRIP-MCNC: (no result) MG/DL
HGB UR QL STRIP: NEGATIVE
KETONES UR STRIP-MCNC: (no result) MG/DL
LEUKOCYTE ESTERASE UR QL STRIP: NEGATIVE
NITRITE UR QL STRIP: NEGATIVE
PH UR STRIP: 5.5 [PH] (ref 5–8)
PROT UR QL STRIP: NEGATIVE
SP GR UR STRIP: >=1.03 (ref 1–1.03)
UROBILINOGEN UR STRIP-MCNC: 0.2 MG/DL (ref 0.2–1)

## 2019-12-05 RX ADMIN — DEXTROSE AND SODIUM CHLORIDE SCH MLS/HR: 5; 900 INJECTION, SOLUTION INTRAVENOUS at 15:48

## 2019-12-05 RX ADMIN — SODIUM CHLORIDE SCH MLS/HR: 4.5 INJECTION, SOLUTION INTRAVENOUS at 02:24

## 2019-12-06 VITALS — SYSTOLIC BLOOD PRESSURE: 138 MMHG

## 2019-12-06 RX ADMIN — Medication SCH EA: at 23:49

## 2019-12-06 RX ADMIN — Medication SCH MG: at 09:00

## 2019-12-06 RX ADMIN — INSULIN GLARGINE SCH UNITS: 100 INJECTION, SOLUTION SUBCUTANEOUS at 23:50

## 2019-12-06 RX ADMIN — DEXTROSE AND SODIUM CHLORIDE SCH MLS/HR: 5; 900 INJECTION, SOLUTION INTRAVENOUS at 04:18

## 2019-12-06 RX ADMIN — FAMOTIDINE SCH MG: 10 INJECTION INTRAVENOUS at 09:00

## 2019-12-06 RX ADMIN — DEXTROSE AND SODIUM CHLORIDE SCH MLS/HR: 5; 900 INJECTION, SOLUTION INTRAVENOUS at 16:48

## 2019-12-07 VITALS — SYSTOLIC BLOOD PRESSURE: 148 MMHG

## 2019-12-07 VITALS — SYSTOLIC BLOOD PRESSURE: 128 MMHG

## 2019-12-07 VITALS — SYSTOLIC BLOOD PRESSURE: 136 MMHG

## 2019-12-07 LAB
ALBUMIN SERPL BCP-MCNC: 3.1 G/DL (ref 3.4–4.8)
ALT SERPL W P-5'-P-CCNC: 32 U/L (ref 12–78)
ANION GAP SERPL CALCULATED.3IONS-SCNC: 7 MMOL/L (ref 5–15)
AST SERPL W P-5'-P-CCNC: 42 U/L (ref 10–37)
BASOPHILS # BLD AUTO: 0 K/UL (ref 0–0.2)
BASOPHILS NFR BLD AUTO: 0.4 % (ref 0–2)
BILIRUB SERPL-MCNC: 1.5 MG/DL (ref 0–1)
BUN SERPL-MCNC: 19 MG/DL (ref 8–21)
CALCIUM SERPL-MCNC: 9.1 MG/DL (ref 8.4–11)
CHLORIDE SERPL-SCNC: 105 MMOL/L (ref 98–107)
CREAT SERPL-MCNC: 0.76 MG/DL (ref 0.55–1.3)
EOSINOPHIL # BLD AUTO: 0.1 K/UL (ref 0–0.4)
EOSINOPHIL NFR BLD AUTO: 0.8 % (ref 0–4)
ERYTHROCYTE [DISTWIDTH] IN BLOOD BY AUTOMATED COUNT: 13.4 % (ref 9–15)
GFR SERPL CREATININE-BSD FRML MDRD: 130 ML/MIN (ref 90–?)
GLUCOSE SERPL-MCNC: 92 MG/DL (ref 70–99)
HCT VFR BLD AUTO: 43.2 % (ref 36–54)
HGB BLD-MCNC: 14.5 G/DL (ref 14–18)
LYMPHOCYTES # BLD AUTO: 1.1 K/UL (ref 1–5.5)
LYMPHOCYTES NFR BLD AUTO: 12.9 % (ref 20.5–51.5)
MCH RBC QN AUTO: 31 PG (ref 27–31)
MCHC RBC AUTO-ENTMCNC: 33 % (ref 32–36)
MCV RBC AUTO: 92 FL (ref 79–98)
MONOCYTES # BLD MANUAL: 1.2 K/UL (ref 0–1)
MONOCYTES # BLD MANUAL: 13.6 % (ref 1.7–9.3)
NEUTROPHILS # BLD AUTO: 6.1 K/UL (ref 1.8–7.7)
NEUTROPHILS NFR BLD AUTO: 72.3 % (ref 40–70)
PLATELET # BLD AUTO: 208 K/UL (ref 130–430)
POTASSIUM SERPL-SCNC: 3.2 MMOL/L (ref 3.5–5.1)
RBC # BLD AUTO: 4.68 MIL/UL (ref 4.2–6.2)
SODIUM SERPLBLD-SCNC: 141 MMOL/L (ref 136–145)
WBC # BLD AUTO: 8.5 K/UL (ref 4.8–10.8)

## 2019-12-07 RX ADMIN — Medication SCH EA: at 20:57

## 2019-12-07 RX ADMIN — DEXTROSE AND SODIUM CHLORIDE SCH MLS/HR: 5; 900 INJECTION, SOLUTION INTRAVENOUS at 20:48

## 2019-12-07 RX ADMIN — SODIUM CHLORIDE SCH MLS/HR: 4.5 INJECTION, SOLUTION INTRAVENOUS at 06:38

## 2019-12-07 RX ADMIN — DEXTROSE AND SODIUM CHLORIDE SCH MLS/HR: 5; 900 INJECTION, SOLUTION INTRAVENOUS at 05:18

## 2019-12-07 RX ADMIN — FAMOTIDINE SCH MG: 10 INJECTION INTRAVENOUS at 09:35

## 2019-12-07 RX ADMIN — INSULIN GLARGINE SCH UNITS: 100 INJECTION, SOLUTION SUBCUTANEOUS at 20:59

## 2019-12-07 RX ADMIN — Medication SCH EA: at 17:16

## 2019-12-07 RX ADMIN — INSULIN LISPRO PRN UNITS: 100 INJECTION, SOLUTION INTRAVENOUS; SUBCUTANEOUS at 17:35

## 2019-12-07 RX ADMIN — Medication SCH EA: at 11:17

## 2019-12-07 RX ADMIN — DEXTROSE AND SODIUM CHLORIDE SCH MLS/HR: 5; 900 INJECTION, SOLUTION INTRAVENOUS at 17:11

## 2019-12-07 RX ADMIN — FAMOTIDINE SCH MG: 10 INJECTION INTRAVENOUS at 09:37

## 2019-12-07 RX ADMIN — Medication SCH EA: at 06:38

## 2019-12-07 RX ADMIN — Medication SCH MG: at 09:37

## 2019-12-07 RX ADMIN — Medication SCH MG: at 09:35

## 2019-12-08 VITALS — SYSTOLIC BLOOD PRESSURE: 163 MMHG

## 2019-12-08 VITALS — SYSTOLIC BLOOD PRESSURE: 111 MMHG

## 2019-12-08 VITALS — SYSTOLIC BLOOD PRESSURE: 134 MMHG

## 2019-12-08 VITALS — SYSTOLIC BLOOD PRESSURE: 160 MMHG

## 2019-12-08 LAB
ALBUMIN SERPL BCP-MCNC: 2.7 G/DL (ref 3.4–4.8)
ALT SERPL W P-5'-P-CCNC: 31 U/L (ref 12–78)
ANION GAP SERPL CALCULATED.3IONS-SCNC: 7 MMOL/L (ref 5–15)
AST SERPL W P-5'-P-CCNC: 33 U/L (ref 10–37)
BASOPHILS # BLD AUTO: 0.1 K/UL (ref 0–0.2)
BASOPHILS NFR BLD AUTO: 0.8 % (ref 0–2)
BILIRUB SERPL-MCNC: 1.1 MG/DL (ref 0–1)
BUN SERPL-MCNC: 12 MG/DL (ref 8–21)
CALCIUM SERPL-MCNC: 8.3 MG/DL (ref 8.4–11)
CHLORIDE SERPL-SCNC: 106 MMOL/L (ref 98–107)
CREAT SERPL-MCNC: 0.7 MG/DL (ref 0.55–1.3)
EOSINOPHIL # BLD AUTO: 0.1 K/UL (ref 0–0.4)
EOSINOPHIL NFR BLD AUTO: 0.7 % (ref 0–4)
ERYTHROCYTE [DISTWIDTH] IN BLOOD BY AUTOMATED COUNT: 13.2 % (ref 9–15)
GFR SERPL CREATININE-BSD FRML MDRD: 143 ML/MIN (ref 90–?)
GLUCOSE SERPL-MCNC: 189 MG/DL (ref 70–99)
HCT VFR BLD AUTO: 40.2 % (ref 36–54)
HGB BLD-MCNC: 13.7 G/DL (ref 14–18)
LYMPHOCYTES # BLD AUTO: 1.1 K/UL (ref 1–5.5)
LYMPHOCYTES NFR BLD AUTO: 12 % (ref 20.5–51.5)
MCH RBC QN AUTO: 31 PG (ref 27–31)
MCHC RBC AUTO-ENTMCNC: 34 % (ref 32–36)
MCV RBC AUTO: 91 FL (ref 79–98)
MONOCYTES # BLD MANUAL: 1.1 K/UL (ref 0–1)
MONOCYTES # BLD MANUAL: 12.5 % (ref 1.7–9.3)
NEUTROPHILS # BLD AUTO: 6.8 K/UL (ref 1.8–7.7)
NEUTROPHILS NFR BLD AUTO: 74 % (ref 40–70)
PLATELET # BLD AUTO: 203 K/UL (ref 130–430)
POTASSIUM SERPL-SCNC: 3.1 MMOL/L (ref 3.5–5.1)
RBC # BLD AUTO: 4.39 MIL/UL (ref 4.2–6.2)
SODIUM SERPLBLD-SCNC: 140 MMOL/L (ref 136–145)
WBC # BLD AUTO: 9.2 K/UL (ref 4.8–10.8)

## 2019-12-08 RX ADMIN — INSULIN LISPRO PRN UNITS: 100 INJECTION, SOLUTION INTRAVENOUS; SUBCUTANEOUS at 12:22

## 2019-12-08 RX ADMIN — DEXTROSE, SODIUM CHLORIDE, AND POTASSIUM CHLORIDE SCH MLS/HR: 5; .45; .3 INJECTION INTRAVENOUS at 15:02

## 2019-12-08 RX ADMIN — FAMOTIDINE SCH MG: 10 INJECTION INTRAVENOUS at 08:47

## 2019-12-08 RX ADMIN — INSULIN GLARGINE SCH UNITS: 100 INJECTION, SOLUTION SUBCUTANEOUS at 21:56

## 2019-12-08 RX ADMIN — Medication SCH MG: at 08:47

## 2019-12-08 RX ADMIN — Medication SCH EA: at 16:55

## 2019-12-08 RX ADMIN — Medication SCH EA: at 21:55

## 2019-12-08 RX ADMIN — DEXTROSE, SODIUM CHLORIDE, AND POTASSIUM CHLORIDE SCH MLS/HR: 5; .45; .3 INJECTION INTRAVENOUS at 23:42

## 2019-12-08 RX ADMIN — Medication SCH EA: at 06:23

## 2019-12-08 RX ADMIN — Medication SCH EA: at 12:18

## 2019-12-09 VITALS — SYSTOLIC BLOOD PRESSURE: 131 MMHG

## 2019-12-09 VITALS — SYSTOLIC BLOOD PRESSURE: 135 MMHG

## 2019-12-09 LAB
ALBUMIN SERPL BCP-MCNC: 3.1 G/DL (ref 3.4–4.8)
ALT SERPL W P-5'-P-CCNC: 38 U/L (ref 12–78)
ANION GAP SERPL CALCULATED.3IONS-SCNC: 8 MMOL/L (ref 5–15)
AST SERPL W P-5'-P-CCNC: 40 U/L (ref 10–37)
BASOPHILS # BLD AUTO: 0.1 K/UL (ref 0–0.2)
BASOPHILS NFR BLD AUTO: 0.7 % (ref 0–2)
BILIRUB SERPL-MCNC: 1.4 MG/DL (ref 0–1)
BUN SERPL-MCNC: 8 MG/DL (ref 8–21)
CALCIUM SERPL-MCNC: 9 MG/DL (ref 8.4–11)
CHLORIDE SERPL-SCNC: 105 MMOL/L (ref 98–107)
CREAT SERPL-MCNC: 0.68 MG/DL (ref 0.55–1.3)
EOSINOPHIL # BLD AUTO: 0.1 K/UL (ref 0–0.4)
EOSINOPHIL NFR BLD AUTO: 0.5 % (ref 0–4)
ERYTHROCYTE [DISTWIDTH] IN BLOOD BY AUTOMATED COUNT: 13.2 % (ref 9–15)
GFR SERPL CREATININE-BSD FRML MDRD: 148 ML/MIN (ref 90–?)
GLUCOSE SERPL-MCNC: 144 MG/DL (ref 70–99)
HCT VFR BLD AUTO: 42.9 % (ref 36–54)
HGB BLD-MCNC: 14.5 G/DL (ref 14–18)
LYMPHOCYTES # BLD AUTO: 1.2 K/UL (ref 1–5.5)
LYMPHOCYTES NFR BLD AUTO: 11.8 % (ref 20.5–51.5)
MCH RBC QN AUTO: 31 PG (ref 27–31)
MCHC RBC AUTO-ENTMCNC: 34 % (ref 32–36)
MCV RBC AUTO: 92 FL (ref 79–98)
MONOCYTES # BLD MANUAL: 1.4 K/UL (ref 0–1)
MONOCYTES # BLD MANUAL: 14.4 % (ref 1.7–9.3)
NEUTROPHILS # BLD AUTO: 7 K/UL (ref 1.8–7.7)
NEUTROPHILS NFR BLD AUTO: 72.6 % (ref 40–70)
PHOSPHATE SERPL-MCNC: 2.5 MG/DL (ref 2.7–4.5)
PLATELET # BLD AUTO: 226 K/UL (ref 130–430)
POTASSIUM SERPL-SCNC: 3.5 MMOL/L (ref 3.5–5.1)
RBC # BLD AUTO: 4.67 MIL/UL (ref 4.2–6.2)
SODIUM SERPLBLD-SCNC: 139 MMOL/L (ref 136–145)
TRIGL SERPL-MCNC: 66 MG/DL (ref 30–150)
WBC # BLD AUTO: 9.7 K/UL (ref 4.8–10.8)

## 2019-12-09 RX ADMIN — FAMOTIDINE SCH MG: 10 INJECTION INTRAVENOUS at 08:53

## 2019-12-09 RX ADMIN — Medication SCH EA: at 11:27

## 2019-12-09 RX ADMIN — HUMAN INSULIN PRN UNITS: 100 INJECTION, SOLUTION SUBCUTANEOUS at 11:28

## 2019-12-09 RX ADMIN — Medication SCH EA: at 17:20

## 2019-12-09 RX ADMIN — DEXTROSE, SODIUM CHLORIDE, AND POTASSIUM CHLORIDE SCH MLS/HR: 5; .45; .3 INJECTION INTRAVENOUS at 17:23

## 2019-12-09 RX ADMIN — Medication SCH EA: at 06:09

## 2019-12-09 RX ADMIN — Medication SCH EA: at 21:02

## 2019-12-09 RX ADMIN — Medication SCH MG: at 08:52

## 2019-12-09 RX ADMIN — I.V. FAT EMULSION SCH MLS/HR: 20 EMULSION INTRAVENOUS at 21:04

## 2019-12-10 VITALS — SYSTOLIC BLOOD PRESSURE: 137 MMHG

## 2019-12-10 VITALS — SYSTOLIC BLOOD PRESSURE: 146 MMHG

## 2019-12-10 VITALS — SYSTOLIC BLOOD PRESSURE: 123 MMHG

## 2019-12-10 LAB
ALBUMIN SERPL BCP-MCNC: 2.6 G/DL (ref 3.4–4.8)
ALT SERPL W P-5'-P-CCNC: 34 U/L (ref 12–78)
ANION GAP SERPL CALCULATED.3IONS-SCNC: 8 MMOL/L (ref 5–15)
AST SERPL W P-5'-P-CCNC: 40 U/L (ref 10–37)
BASOPHILS # BLD AUTO: 0.1 K/UL (ref 0–0.2)
BASOPHILS NFR BLD AUTO: 0.9 % (ref 0–2)
BILIRUB SERPL-MCNC: 1.6 MG/DL (ref 0–1)
BUN SERPL-MCNC: 10 MG/DL (ref 8–21)
CALCIUM SERPL-MCNC: 8.4 MG/DL (ref 8.4–11)
CHLORIDE SERPL-SCNC: 105 MMOL/L (ref 98–107)
CREAT SERPL-MCNC: 0.69 MG/DL (ref 0.55–1.3)
EOSINOPHIL # BLD AUTO: 0.1 K/UL (ref 0–0.4)
EOSINOPHIL NFR BLD AUTO: 0.6 % (ref 0–4)
ERYTHROCYTE [DISTWIDTH] IN BLOOD BY AUTOMATED COUNT: 13.4 % (ref 9–15)
GFR SERPL CREATININE-BSD FRML MDRD: 146 ML/MIN (ref 90–?)
GLUCOSE SERPL-MCNC: 183 MG/DL (ref 70–99)
HCT VFR BLD AUTO: 41.6 % (ref 36–54)
HGB BLD-MCNC: 14.1 G/DL (ref 14–18)
INR PPP: 1.2 (ref 0.8–1.2)
LYMPHOCYTES # BLD AUTO: 1.1 K/UL (ref 1–5.5)
LYMPHOCYTES NFR BLD AUTO: 11.2 % (ref 20.5–51.5)
MCH RBC QN AUTO: 31 PG (ref 27–31)
MCHC RBC AUTO-ENTMCNC: 34 % (ref 32–36)
MCV RBC AUTO: 92 FL (ref 79–98)
MONOCYTES # BLD MANUAL: 1.6 K/UL (ref 0–1)
MONOCYTES # BLD MANUAL: 15.2 % (ref 1.7–9.3)
NEUTROPHILS # BLD AUTO: 7.4 K/UL (ref 1.8–7.7)
NEUTROPHILS NFR BLD AUTO: 72.1 % (ref 40–70)
PHOSPHATE SERPL-MCNC: 3.7 MG/DL (ref 2.7–4.5)
PLATELET # BLD AUTO: 208 K/UL (ref 130–430)
POTASSIUM SERPL-SCNC: 4 MMOL/L (ref 3.5–5.1)
PROTHROMBIN TIME: 12 SECS (ref 9.5–12.5)
RBC # BLD AUTO: 4.54 MIL/UL (ref 4.2–6.2)
SODIUM SERPLBLD-SCNC: 137 MMOL/L (ref 136–145)
WBC # BLD AUTO: 10.2 K/UL (ref 4.8–10.8)

## 2019-12-10 RX ADMIN — HUMAN INSULIN PRN UNITS: 100 INJECTION, SOLUTION SUBCUTANEOUS at 23:08

## 2019-12-10 RX ADMIN — HUMAN INSULIN PRN UNITS: 100 INJECTION, SOLUTION SUBCUTANEOUS at 11:10

## 2019-12-10 RX ADMIN — Medication SCH EA: at 07:02

## 2019-12-10 RX ADMIN — Medication SCH EA: at 11:07

## 2019-12-10 RX ADMIN — I.V. FAT EMULSION SCH MLS/HR: 20 EMULSION INTRAVENOUS at 21:08

## 2019-12-10 RX ADMIN — DEXTROSE, SODIUM CHLORIDE, AND POTASSIUM CHLORIDE SCH MLS/HR: 5; .45; .3 INJECTION INTRAVENOUS at 07:03

## 2019-12-10 RX ADMIN — FAMOTIDINE SCH MG: 10 INJECTION INTRAVENOUS at 08:17

## 2019-12-10 RX ADMIN — Medication SCH EA: at 17:17

## 2019-12-10 RX ADMIN — HUMAN INSULIN PRN UNITS: 100 INJECTION, SOLUTION SUBCUTANEOUS at 17:19

## 2019-12-10 RX ADMIN — Medication SCH MG: at 08:17

## 2019-12-10 RX ADMIN — Medication SCH EA: at 23:05

## 2019-12-11 VITALS — SYSTOLIC BLOOD PRESSURE: 148 MMHG

## 2019-12-11 VITALS — SYSTOLIC BLOOD PRESSURE: 125 MMHG

## 2019-12-11 VITALS — SYSTOLIC BLOOD PRESSURE: 103 MMHG

## 2019-12-11 VITALS — SYSTOLIC BLOOD PRESSURE: 117 MMHG

## 2019-12-11 VITALS — SYSTOLIC BLOOD PRESSURE: 144 MMHG

## 2019-12-11 VITALS — SYSTOLIC BLOOD PRESSURE: 134 MMHG

## 2019-12-11 LAB
INR PPP: 1.1 (ref 0.8–1.2)
PROTHROMBIN TIME: 11.5 SECS (ref 9.5–12.5)

## 2019-12-11 PROCEDURE — 0DB68ZX EXCISION OF STOMACH, VIA NATURAL OR ARTIFICIAL OPENING ENDOSCOPIC, DIAGNOSTIC: ICD-10-PCS | Performed by: INTERNAL MEDICINE

## 2019-12-11 PROCEDURE — 0DH63UZ INSERTION OF FEEDING DEVICE INTO STOMACH, PERCUTANEOUS APPROACH: ICD-10-PCS | Performed by: INTERNAL MEDICINE

## 2019-12-11 RX ADMIN — VALPROIC ACID SCH MG: 250 SOLUTION ORAL at 21:04

## 2019-12-11 RX ADMIN — FAMOTIDINE SCH MG: 10 INJECTION INTRAVENOUS at 11:57

## 2019-12-11 RX ADMIN — Medication SCH EA: at 06:14

## 2019-12-11 RX ADMIN — DEXTROSE AND SODIUM CHLORIDE SCH MLS/HR: 5; 450 INJECTION, SOLUTION INTRAVENOUS at 23:05

## 2019-12-11 RX ADMIN — MIDAZOLAM HYDROCHLORIDE ONE MG: 1 INJECTION, SOLUTION INTRAMUSCULAR; INTRAVENOUS at 07:21

## 2019-12-11 RX ADMIN — Medication SCH EA: at 23:05

## 2019-12-11 RX ADMIN — MIDAZOLAM HYDROCHLORIDE ONE MG: 1 INJECTION, SOLUTION INTRAMUSCULAR; INTRAVENOUS at 07:27

## 2019-12-11 RX ADMIN — MIDAZOLAM HYDROCHLORIDE ONE MG: 1 INJECTION, SOLUTION INTRAMUSCULAR; INTRAVENOUS at 07:15

## 2019-12-11 RX ADMIN — FENTANYL CITRATE ONE MCG: 50 INJECTION, SOLUTION INTRAMUSCULAR; INTRAVENOUS at 07:21

## 2019-12-11 RX ADMIN — HUMAN INSULIN PRN UNITS: 100 INJECTION, SOLUTION SUBCUTANEOUS at 06:16

## 2019-12-11 RX ADMIN — HUMAN INSULIN PRN UNITS: 100 INJECTION, SOLUTION SUBCUTANEOUS at 18:27

## 2019-12-11 RX ADMIN — Medication SCH EA: at 12:12

## 2019-12-11 RX ADMIN — Medication SCH MG: at 11:57

## 2019-12-11 RX ADMIN — HUMAN INSULIN PRN UNITS: 100 INJECTION, SOLUTION SUBCUTANEOUS at 23:06

## 2019-12-11 RX ADMIN — FENTANYL CITRATE ONE MCG: 50 INJECTION, SOLUTION INTRAMUSCULAR; INTRAVENOUS at 07:15

## 2019-12-11 RX ADMIN — ATORVASTATIN CALCIUM SCH MG: 20 TABLET, FILM COATED ORAL at 21:03

## 2019-12-11 RX ADMIN — Medication SCH EA: at 18:24

## 2019-12-11 RX ADMIN — HUMAN INSULIN PRN UNITS: 100 INJECTION, SOLUTION SUBCUTANEOUS at 12:17

## 2019-12-12 VITALS — SYSTOLIC BLOOD PRESSURE: 103 MMHG

## 2019-12-12 VITALS — SYSTOLIC BLOOD PRESSURE: 107 MMHG

## 2019-12-12 VITALS — SYSTOLIC BLOOD PRESSURE: 134 MMHG

## 2019-12-12 VITALS — SYSTOLIC BLOOD PRESSURE: 117 MMHG

## 2019-12-12 VITALS — SYSTOLIC BLOOD PRESSURE: 94 MMHG

## 2019-12-12 LAB
ALBUMIN SERPL BCP-MCNC: 2.6 G/DL (ref 3.4–4.8)
ALT SERPL W P-5'-P-CCNC: 31 U/L (ref 12–78)
ANION GAP SERPL CALCULATED.3IONS-SCNC: 4 MMOL/L (ref 5–15)
AST SERPL W P-5'-P-CCNC: 21 U/L (ref 10–37)
BASOPHILS # BLD AUTO: 0.1 K/UL (ref 0–0.2)
BASOPHILS NFR BLD AUTO: 0.6 % (ref 0–2)
BILIRUB SERPL-MCNC: 1.1 MG/DL (ref 0–1)
BUN SERPL-MCNC: 24 MG/DL (ref 8–21)
CALCIUM SERPL-MCNC: 9 MG/DL (ref 8.4–11)
CHLORIDE SERPL-SCNC: 103 MMOL/L (ref 98–107)
CREAT SERPL-MCNC: 1.45 MG/DL (ref 0.55–1.3)
EOSINOPHIL # BLD AUTO: 0 K/UL (ref 0–0.4)
EOSINOPHIL NFR BLD AUTO: 0.5 % (ref 0–4)
ERYTHROCYTE [DISTWIDTH] IN BLOOD BY AUTOMATED COUNT: 13.6 % (ref 9–15)
GFR SERPL CREATININE-BSD FRML MDRD: 62 ML/MIN (ref 90–?)
GLUCOSE SERPL-MCNC: 239 MG/DL (ref 70–99)
HCT VFR BLD AUTO: 39.2 % (ref 36–54)
HGB BLD-MCNC: 13.2 G/DL (ref 14–18)
LYMPHOCYTES # BLD AUTO: 1.1 K/UL (ref 1–5.5)
LYMPHOCYTES NFR BLD AUTO: 11.7 % (ref 20.5–51.5)
MCH RBC QN AUTO: 31 PG (ref 27–31)
MCHC RBC AUTO-ENTMCNC: 34 % (ref 32–36)
MCV RBC AUTO: 92 FL (ref 79–98)
MONOCYTES # BLD MANUAL: 1.4 K/UL (ref 0–1)
MONOCYTES # BLD MANUAL: 15.2 % (ref 1.7–9.3)
NEUTROPHILS # BLD AUTO: 6.5 K/UL (ref 1.8–7.7)
NEUTROPHILS NFR BLD AUTO: 72 % (ref 40–70)
PLATELET # BLD AUTO: 243 K/UL (ref 130–430)
POTASSIUM SERPL-SCNC: 3.8 MMOL/L (ref 3.5–5.1)
RBC # BLD AUTO: 4.26 MIL/UL (ref 4.2–6.2)
SODIUM SERPLBLD-SCNC: 138 MMOL/L (ref 136–145)
WBC # BLD AUTO: 9 K/UL (ref 4.8–10.8)

## 2019-12-12 RX ADMIN — Medication SCH EA: at 20:54

## 2019-12-12 RX ADMIN — Medication SCH EA: at 06:30

## 2019-12-12 RX ADMIN — Medication SCH EA: at 16:45

## 2019-12-12 RX ADMIN — MEMANTINE HYDROCHLORIDE SCH MG: 5 TABLET ORAL at 09:03

## 2019-12-12 RX ADMIN — HUMAN INSULIN PRN UNITS: 100 INJECTION, SOLUTION SUBCUTANEOUS at 06:36

## 2019-12-12 RX ADMIN — Medication SCH EA: at 11:49

## 2019-12-12 RX ADMIN — ATORVASTATIN CALCIUM SCH MG: 20 TABLET, FILM COATED ORAL at 20:34

## 2019-12-12 RX ADMIN — VALPROIC ACID SCH MG: 250 SOLUTION ORAL at 20:34

## 2019-12-12 RX ADMIN — Medication SCH MG: at 09:03

## 2019-12-12 RX ADMIN — VALPROIC ACID SCH MG: 250 SOLUTION ORAL at 09:04

## 2019-12-12 RX ADMIN — HUMAN INSULIN PRN UNITS: 100 INJECTION, SOLUTION SUBCUTANEOUS at 12:22

## 2019-12-12 RX ADMIN — HUMAN INSULIN PRN UNITS: 100 INJECTION, SOLUTION SUBCUTANEOUS at 21:05

## 2019-12-12 RX ADMIN — FAMOTIDINE SCH MG: 10 INJECTION INTRAVENOUS at 09:04

## 2019-12-13 VITALS — SYSTOLIC BLOOD PRESSURE: 115 MMHG

## 2019-12-13 VITALS — SYSTOLIC BLOOD PRESSURE: 118 MMHG

## 2019-12-13 VITALS — SYSTOLIC BLOOD PRESSURE: 95 MMHG

## 2019-12-13 LAB
ANION GAP SERPL CALCULATED.3IONS-SCNC: 9 MMOL/L (ref 5–15)
BASOPHILS # BLD AUTO: 0.1 K/UL (ref 0–0.2)
BASOPHILS NFR BLD AUTO: 1.3 % (ref 0–2)
BUN SERPL-MCNC: 43 MG/DL (ref 8–21)
CALCIUM SERPL-MCNC: 8.7 MG/DL (ref 8.4–11)
CHLORIDE SERPL-SCNC: 102 MMOL/L (ref 98–107)
CREAT SERPL-MCNC: 1.3 MG/DL (ref 0.55–1.3)
EOSINOPHIL # BLD AUTO: 0.1 K/UL (ref 0–0.4)
EOSINOPHIL NFR BLD AUTO: 1.1 % (ref 0–4)
ERYTHROCYTE [DISTWIDTH] IN BLOOD BY AUTOMATED COUNT: 13.5 % (ref 9–15)
GFR SERPL CREATININE-BSD FRML MDRD: 70 ML/MIN (ref 90–?)
GLUCOSE SERPL-MCNC: 260 MG/DL (ref 70–99)
HCT VFR BLD AUTO: 40 % (ref 36–54)
HGB BLD-MCNC: 13.5 G/DL (ref 14–18)
LYMPHOCYTES # BLD AUTO: 1.4 K/UL (ref 1–5.5)
LYMPHOCYTES NFR BLD AUTO: 14.3 % (ref 20.5–51.5)
MCH RBC QN AUTO: 31 PG (ref 27–31)
MCHC RBC AUTO-ENTMCNC: 34 % (ref 32–36)
MCV RBC AUTO: 93 FL (ref 79–98)
MONOCYTES # BLD MANUAL: 1.3 K/UL (ref 0–1)
MONOCYTES # BLD MANUAL: 12.5 % (ref 1.7–9.3)
NEUTROPHILS # BLD AUTO: 7.1 K/UL (ref 1.8–7.7)
NEUTROPHILS NFR BLD AUTO: 70.8 % (ref 40–70)
PLATELET # BLD AUTO: 242 K/UL (ref 130–430)
POTASSIUM SERPL-SCNC: 4.4 MMOL/L (ref 3.5–5.1)
RBC # BLD AUTO: 4.31 MIL/UL (ref 4.2–6.2)
SODIUM SERPLBLD-SCNC: 138 MMOL/L (ref 136–145)
WBC # BLD AUTO: 10 K/UL (ref 4.8–10.8)

## 2019-12-13 RX ADMIN — Medication SCH EA: at 11:24

## 2019-12-13 RX ADMIN — FAMOTIDINE SCH MG: 10 INJECTION INTRAVENOUS at 09:10

## 2019-12-13 RX ADMIN — VALPROIC ACID SCH MG: 250 SOLUTION ORAL at 09:11

## 2019-12-13 RX ADMIN — DEXTROSE AND SODIUM CHLORIDE SCH MLS/HR: 5; 450 INJECTION, SOLUTION INTRAVENOUS at 03:47

## 2019-12-13 RX ADMIN — MEMANTINE HYDROCHLORIDE SCH MG: 5 TABLET ORAL at 09:10

## 2019-12-13 RX ADMIN — Medication SCH EA: at 06:05

## 2019-12-13 RX ADMIN — Medication SCH MG: at 09:10

## 2019-12-13 RX ADMIN — HUMAN INSULIN PRN UNITS: 100 INJECTION, SOLUTION SUBCUTANEOUS at 11:28

## 2019-12-13 RX ADMIN — HUMAN INSULIN PRN UNITS: 100 INJECTION, SOLUTION SUBCUTANEOUS at 06:06

## 2019-12-17 LAB
ALBUMIN SERPL BCP-MCNC: 2.9 G/DL (ref 3.4–4.8)
ALT SERPL W P-5'-P-CCNC: 33 U/L (ref 12–78)
ANION GAP SERPL CALCULATED.3IONS-SCNC: 4 MMOL/L (ref 5–15)
AST SERPL W P-5'-P-CCNC: 39 U/L (ref 10–37)
BASOPHILS NFR BLD AUTO: 0.5 % (ref 0–2)
BILIRUB SERPL-MCNC: 1.9 MG/DL (ref 0–1)
BUN SERPL-MCNC: 44 MG/DL (ref 8–21)
CALCIUM SERPL-MCNC: 8.9 MG/DL (ref 8.4–11)
CHLORIDE SERPL-SCNC: 105 MMOL/L (ref 98–107)
CREAT SERPL-MCNC: 0.96 MG/DL (ref 0.55–1.3)
EOSINOPHIL NFR BLD AUTO: 0.5 % (ref 0–4)
ERYTHROCYTE [DISTWIDTH] IN BLOOD BY AUTOMATED COUNT: 14 % (ref 9–15)
GFR SERPL CREATININE-BSD FRML MDRD: 100 ML/MIN (ref 90–?)
GLUCOSE SERPL-MCNC: 81 MG/DL (ref 70–99)
HCT VFR BLD AUTO: 42.7 % (ref 36–54)
HGB BLD-MCNC: 14.1 G/DL (ref 14–18)
LYMPHOCYTES NFR BLD AUTO: 12 % (ref 20.5–51.5)
MCH RBC QN AUTO: 31 PG (ref 27–31)
MCHC RBC AUTO-ENTMCNC: 33 % (ref 32–36)
MCV RBC AUTO: 94 FL (ref 79–98)
MONOCYTES # BLD MANUAL: 13.4 % (ref 1.7–9.3)
NEUTROPHILS NFR BLD AUTO: 73.6 % (ref 40–70)
PLATELET # BLD AUTO: 185 K/UL (ref 130–430)
POTASSIUM SERPL-SCNC: 3.6 MMOL/L (ref 3.5–5.1)
RBC # BLD AUTO: 4.55 MIL/UL (ref 4.2–6.2)
SODIUM SERPLBLD-SCNC: 138 MMOL/L (ref 136–145)
WBC # BLD AUTO: 10 K/UL (ref 4.8–10.8)

## 2020-04-20 NOTE — DISCHARGE SUMMARY
DATE OF DISCHARGE:  12/04/2019



HISTORY OF PRESENT ILLNESS:  A 69-year-old male coming from a skilled nursing,

aggressive, agitated, trying to strike, yelling constantly, poor orientation,

mad, impulse or screaming.



PAST PSYCHIATRIC HISTORY:  Likely dementia.



SOCIAL HISTORY:  Coming from a skilled nursing.



MEDICATIONS:  Noted.



PROVISIONAL DIAGNOSES:  Mood, unspecified; anxiety, unspecified; psychosis,

unspecified; rule out dementia, likely dementia.



HOSPITAL COURSE:  After initial assessment, the patient was started on

medications, which were adjusted, titrated.  Over the course of treatment, he

was somewhat calmer, still impulsive, unpredictable, could use further

hospitalization and became dehydrated.  Concerns for medical decompensation,

sent to the local ER and admitted.



CONDITION UPON DISCHARGE:  Improved.  Still irritable, upset, confused, could

use further hospitalization and treatment.



DISCHARGE DIAGNOSES:  Mood, unspecified; anxiety, unspecified; psychosis,

unspecified; dementia.



Please see full H and P, dehydration requiring transfer to higher level of

medical care.



PROGNOSIS:  Could use further hospitalization treatment.





DD: 12/05/2019 12:22

DT: 12/05/2019 14:01

[facility]# [id number]  [id number] verbalization

## 2025-04-10 NOTE — INTERNAL MEDICINE PROG NOTE
Internal Medicine Subjective





- Subjective


Patient seen and examined:: with staff, chart reviewed


Patient is:: awake, verbal, interactive, in bed


Per staff patient has:: no adverse event, no episodes of fall, poor appetite, 

tolerating meds





Internal Medicine Objective





- Physical Exam


Vitals and I&O: 


 Vital Signs











Temp  98.7 F   19 06:45


 


Pulse  89   19 10:43


 


Resp  20   19 06:45


 


BP  138/74   19 10:43


 


Pulse Ox  92   19 06:45








 Intake & Output











 19





 18:59 06:59 18:59


 


Intake Total  240 


 


Balance  240 


 


Intake:   


 


  Oral  240 


 


Other:   


 


  # Voids  2 


 


  # Bowel Movements  1 











Active Medications: 


Current Medications





Acetaminophen (Tylenol)  650 mg PO Q4HR PRN


   PRN Reason: Mild Pain (Scale 1-3)


   Stop: 20 20:49


Acetaminophen (Tylenol)  650 mg PO Q4H PRN


   PRN Reason: TEMP ABOVE 100


   Stop: 20 12:02


Al Hydrox/Mg Hydrox/Simethicone (Maalox)  30 ml PO Q4HR PRN


   PRN Reason: GI DISTRESS


   Stop: 20 22:43


Amlodipine Besylate (Norvasc)  5 mg PO BID [facility]


   Stop: 20 08:59


   Last Admin: 19 10:43 Dose:  5 mg


Aspirin (Aspirin)  325 mg PO DAILY@1200 [facility]


   Stop: 20 11:59


   Last Admin: 19 12:50 Dose:  325 mg


Atorvastatin Calcium (Lipitor)  40 mg PO HS [facility]


   Stop: 20 20:59


   Last Admin: 19 21:38 Dose:  40 mg


Bisacodyl (Dulcolax 10 Mg Supp)  10 mg RC PRN PRN


   PRN Reason: if MOM is ineffective


   Stop: 20 22:49


Divalproex Sodium (Depakote Dr)  250 mg PO BID [facility]; Protocol


   Stop: 20 08:59


   Last Admin: 19 09:00 Dose:  250 mg


Docusate Sodium (Colace)  100 mg PO DAILY [facility]


   Stop: 20 08:59


   Last Admin: 19 10:00 Dose:  100 mg


Insulin Glargine (Lantus Insulin)  10 units SUBQ Q12HR [facility]


   Stop: 20 08:59


   Last Admin: 19 09:00 Dose:  Not Given


Insulin Human Lispro (Humalog Insulin Sliding Scale)  100 units SUBQ HS [facility]; 

Protocol


   Stop: 20 20:59


   Last Admin: 19 21:39 Dose:  Not Given


Lisinopril (Zestril)  10 mg PO HS [facility]


   Stop: 20 20:59


   Last Admin: 19 21:39 Dose:  10 mg


Lorazepam (Ativan)  0.5 mg PO Q4HR PRN; Protocol


   PRN Reason: Anxiety


   Stop: 19 22:43


   Last Admin: 19 10:00 Dose:  0.5 mg


Magnesium Hydroxide (Milk Of Magnesia)  30 ml PO HS PRN


   PRN Reason: Constipation


Memantine (Namenda)  10 mg PO BID [facility]


   Stop: 20 08:59


   Last Admin: 19 10:00 Dose:  10 mg


Multivitamins/Vitamin C (Theragran)  1 tab PO DAILY [facility]


   Stop: 20 08:59


   Last Admin: 19 10:00 Dose:  1 tab


Quetiapine Fumarate (Seroquel)  300 mg PO BID [facility]; Protocol


   Stop: 20 16:59


Zolpidem Tartrate (Ambien)  5 mg PO HS PRN


   PRN Reason: Insomnia


   Stop: 20 22:43


   Last Admin: 19 21:41 Dose:  5 mg








General: weak


HEENT: NC/AT, PERRLA


Neck: Supple, No JVD


Lungs: CTAB


Cardiovascular: RRR, Normal S1, Normal S2, with murmur


Abdomen: soft, globular, non-distended, positive bowel sound


Extremities: excoriation


Neurological: no change





Internal Medicine Assmt/Plan





- Assessment


Assessment: 





ASSESSMENT:


1.  Diabetes.


2.  Renal insufficiency, elevated bilirubin.


3.  Hypertension.


4.  History of stroke.


5.  Dementia.


6.  Schizoaffective disorder.


7.  Hypercholesterolemia.


8.  Gait instability.











- Plan


Plan: 











PLAN:  __cont on __ insulin sliding scale.  We will start the patient on blood 

pressure


medication.  Continue fall precaution.  Continue aspirin.  We will continue


monitoring closely 


dw rn


will change diet consistency





Nutritional Asmnt/Malnutr-PDOC





- Dietary Evaluation


Malnutrition Findings (Please click <Entered> for more info): 








Nutritional Asmnt/Malnutrition                             Start:  19 09:

31


Text:                                                      Status: Complete    

  


Freq:                                                                          

  


Protocol:                                                                      

  


 Document     19 09:31  DONALDMEGANDONITA  (Rec: 19 09:49  MARLODONITA LEWISN-

FNS1)


 Nutritional Asmnt/Malnutrition


     Patient General Information


      Nutritional Screening                      Moderate Risk


      Diagnosis                                  Psychosis


      Pertinent Medical Hx/Surgical Hx           Diabetes, Renal insufficiency,


                                                 hypertension, high


                                                 cholesterol, history of stroke


                                                 , dementia.


      Subjective Information                     Admitted from [facility].


                                                 Per nursing notes, BG was 344


                                                 but patient refused to take


                                                 his insulin. Patient


                                                 tolerating current diet


                                                 without noted difficulty.


      Current Diet Order/ Nutrition Support      60 gm CCHO, Chopped


      Patient / S.O                              Not Indicated


      Pertinent Medications                      Maalox, Lipitor, Dulcolax,


                                                 COlace, Lantus, humalog, MOM,


                                                 theragran


      Pertinent Labs                             POC glucose 344 per nursing


                                                 note


     Nutritional Hx/Data


      Height                                     1.73 m


      Height (Calculated Centimeters)            172.7


      Current Weight (lbs)                       74.843 kg


      Weight (Calculated Kilograms)              74.8


      Weight (Calculated Grams)                  27425.7


      Ideal Body Weight                          154


      % Ideal Body Weight                        107


      Body Mass Index (BMI)                      25.0


      Recent Weight Change                       No


      Weight Status                              Overweight


     GI Symptoms


      GI Symptoms                                None


      Last BM                                    11/29 x 1


      Difficult in:                              None


      Food Allergies                             No


      Cultural/Ethnic/Mormonism Belief           none indicated


      Usual diet at home                         unknown


      Skin Integrity/Comment:                    Sanchez 13, Intact


      Current %PO                                Good (%)


     Estimated Nutritional Goals


      BEE in Kcals:                              Using Current wt


      Calories/Kcals/Kg                          75kg CBW


      Kcals Calculated                           ~8576-1884 kcal/day


      Protein:                                   Using Current wt


      Protein g/k.8-1 gm/kg


      Protein Calculated                         ~60-75 gm/day


      Fluid: ml                                  ~1567-7778 ml/day (1 ml/kcal)


     Nutritional Problem


      1. Problem


       Problem                                   Altered nutrition related lab


                                                 values related to


       Etiology                                  uncontrolled hyperglycemia aeb


       Signs/Symptoms:                           Glucose 344


     Intervention/Recommendation


      Comments                                   1. Continue 60 gm CCHO,


                                                 Chopped diet as tolerated by


                                                 patient.


                                                 2. MD to adjust insulin


                                                 regimen as needed for optimal


                                                 glycemic control. Nursing to


                                                 continue to encourage patient


                                                 to take insulin as prescribed.


     Expected Outcomes/Goals


      Expected Outcomes/Goals                    Oral intake >75% of meals,


                                                 weight stable, nutrition


                                                 related labs WNL


                                                 F/U MR 12/3- PA for ZEPBOUND 2.5MG SUBMITTED to [employer]    via      [x][employer]-Case ID #     [x]PA sent as URGENT    All office notes, labs and other pertaining documents and studies sent. Clinical questions answered. Awaiting determination from insurance company.     Turnaround time for your insurance to make a decision on your Prior Authorization can take 7-21 business days.